# Patient Record
Sex: FEMALE | Race: WHITE | NOT HISPANIC OR LATINO | URBAN - METROPOLITAN AREA
[De-identification: names, ages, dates, MRNs, and addresses within clinical notes are randomized per-mention and may not be internally consistent; named-entity substitution may affect disease eponyms.]

---

## 2022-12-19 ENCOUNTER — INPATIENT (INPATIENT)
Facility: HOSPITAL | Age: 72
LOS: 2 days | Discharge: HOME | DRG: 69 | End: 2022-12-22
Attending: PSYCHIATRY & NEUROLOGY | Admitting: PSYCHIATRY & NEUROLOGY
Payer: MEDICARE

## 2022-12-19 VITALS
HEIGHT: 60.63 IN | OXYGEN SATURATION: 98 % | TEMPERATURE: 98 F | DIASTOLIC BLOOD PRESSURE: 78 MMHG | HEART RATE: 90 BPM | SYSTOLIC BLOOD PRESSURE: 161 MMHG | WEIGHT: 145.95 LBS | RESPIRATION RATE: 20 BRPM

## 2022-12-19 LAB
ALBUMIN SERPL ELPH-MCNC: 4.3 G/DL — SIGNIFICANT CHANGE UP (ref 3.3–5)
ALP SERPL-CCNC: 76 U/L — SIGNIFICANT CHANGE UP (ref 40–120)
ALT FLD-CCNC: 21 U/L — SIGNIFICANT CHANGE UP (ref 10–45)
ANION GAP SERPL CALC-SCNC: 11 MMOL/L — SIGNIFICANT CHANGE UP (ref 5–17)
APTT BLD: 28.3 SEC — SIGNIFICANT CHANGE UP (ref 27.5–35.5)
AST SERPL-CCNC: 34 U/L — SIGNIFICANT CHANGE UP (ref 10–40)
BASOPHILS # BLD AUTO: 0.03 K/UL — SIGNIFICANT CHANGE UP (ref 0–0.2)
BASOPHILS NFR BLD AUTO: 0.6 % — SIGNIFICANT CHANGE UP (ref 0–2)
BILIRUB SERPL-MCNC: 0.2 MG/DL — SIGNIFICANT CHANGE UP (ref 0.2–1.2)
BUN SERPL-MCNC: 13 MG/DL — SIGNIFICANT CHANGE UP (ref 7–23)
CALCIUM SERPL-MCNC: 9.1 MG/DL — SIGNIFICANT CHANGE UP (ref 8.4–10.5)
CHLORIDE SERPL-SCNC: 100 MMOL/L — SIGNIFICANT CHANGE UP (ref 96–108)
CO2 SERPL-SCNC: 23 MMOL/L — SIGNIFICANT CHANGE UP (ref 22–31)
CREAT SERPL-MCNC: 0.92 MG/DL — SIGNIFICANT CHANGE UP (ref 0.5–1.3)
EGFR: 67 ML/MIN/1.73M2 — SIGNIFICANT CHANGE UP
EOSINOPHIL # BLD AUTO: 0.08 K/UL — SIGNIFICANT CHANGE UP (ref 0–0.5)
EOSINOPHIL NFR BLD AUTO: 1.6 % — SIGNIFICANT CHANGE UP (ref 0–6)
GLUCOSE SERPL-MCNC: 105 MG/DL — HIGH (ref 70–99)
HCT VFR BLD CALC: 37.6 % — SIGNIFICANT CHANGE UP (ref 34.5–45)
HGB BLD-MCNC: 12.5 G/DL — SIGNIFICANT CHANGE UP (ref 11.5–15.5)
IMM GRANULOCYTES NFR BLD AUTO: 0.2 % — SIGNIFICANT CHANGE UP (ref 0–0.9)
INR BLD: 1.02 — SIGNIFICANT CHANGE UP (ref 0.88–1.16)
LYMPHOCYTES # BLD AUTO: 1.76 K/UL — SIGNIFICANT CHANGE UP (ref 1–3.3)
LYMPHOCYTES # BLD AUTO: 36.3 % — SIGNIFICANT CHANGE UP (ref 13–44)
MCHC RBC-ENTMCNC: 29.2 PG — SIGNIFICANT CHANGE UP (ref 27–34)
MCHC RBC-ENTMCNC: 33.2 GM/DL — SIGNIFICANT CHANGE UP (ref 32–36)
MCV RBC AUTO: 87.9 FL — SIGNIFICANT CHANGE UP (ref 80–100)
MONOCYTES # BLD AUTO: 0.47 K/UL — SIGNIFICANT CHANGE UP (ref 0–0.9)
MONOCYTES NFR BLD AUTO: 9.7 % — SIGNIFICANT CHANGE UP (ref 2–14)
NEUTROPHILS # BLD AUTO: 2.5 K/UL — SIGNIFICANT CHANGE UP (ref 1.8–7.4)
NEUTROPHILS NFR BLD AUTO: 51.6 % — SIGNIFICANT CHANGE UP (ref 43–77)
NRBC # BLD: 0 /100 WBCS — SIGNIFICANT CHANGE UP (ref 0–0)
PLATELET # BLD AUTO: 243 K/UL — SIGNIFICANT CHANGE UP (ref 150–400)
POTASSIUM SERPL-MCNC: 4.5 MMOL/L — SIGNIFICANT CHANGE UP (ref 3.5–5.3)
POTASSIUM SERPL-SCNC: 4.5 MMOL/L — SIGNIFICANT CHANGE UP (ref 3.5–5.3)
PROT SERPL-MCNC: 7.8 G/DL — SIGNIFICANT CHANGE UP (ref 6–8.3)
PROTHROM AB SERPL-ACNC: 12.1 SEC — SIGNIFICANT CHANGE UP (ref 10.5–13.4)
RBC # BLD: 4.28 M/UL — SIGNIFICANT CHANGE UP (ref 3.8–5.2)
RBC # FLD: 12.7 % — SIGNIFICANT CHANGE UP (ref 10.3–14.5)
SARS-COV-2 RNA SPEC QL NAA+PROBE: POSITIVE
SODIUM SERPL-SCNC: 134 MMOL/L — LOW (ref 135–145)
TROPONIN T SERPL-MCNC: 0.01 NG/ML — SIGNIFICANT CHANGE UP (ref 0–0.01)
WBC # BLD: 4.85 K/UL — SIGNIFICANT CHANGE UP (ref 3.8–10.5)
WBC # FLD AUTO: 4.85 K/UL — SIGNIFICANT CHANGE UP (ref 3.8–10.5)

## 2022-12-19 PROCEDURE — 70496 CT ANGIOGRAPHY HEAD: CPT | Mod: 26,MA

## 2022-12-19 PROCEDURE — 0042T: CPT | Mod: MA

## 2022-12-19 PROCEDURE — 99291 CRITICAL CARE FIRST HOUR: CPT | Mod: CS

## 2022-12-19 PROCEDURE — 70498 CT ANGIOGRAPHY NECK: CPT | Mod: 26,MA

## 2022-12-19 PROCEDURE — 99223 1ST HOSP IP/OBS HIGH 75: CPT

## 2022-12-19 PROCEDURE — 93010 ELECTROCARDIOGRAM REPORT: CPT

## 2022-12-19 RX ORDER — CLOPIDOGREL BISULFATE 75 MG/1
75 TABLET, FILM COATED ORAL DAILY
Refills: 0 | Status: DISCONTINUED | OUTPATIENT
Start: 2022-12-20 | End: 2022-12-21

## 2022-12-19 RX ORDER — ATORVASTATIN CALCIUM 80 MG/1
80 TABLET, FILM COATED ORAL AT BEDTIME
Refills: 0 | Status: DISCONTINUED | OUTPATIENT
Start: 2022-12-19 | End: 2022-12-21

## 2022-12-19 RX ORDER — LISINOPRIL 2.5 MG/1
1 TABLET ORAL
Qty: 0 | Refills: 0 | DISCHARGE

## 2022-12-19 RX ORDER — CLOPIDOGREL BISULFATE 75 MG/1
75 TABLET, FILM COATED ORAL ONCE
Refills: 0 | Status: COMPLETED | OUTPATIENT
Start: 2022-12-19 | End: 2022-12-19

## 2022-12-19 RX ORDER — ENOXAPARIN SODIUM 100 MG/ML
40 INJECTION SUBCUTANEOUS EVERY 24 HOURS
Refills: 0 | Status: DISCONTINUED | OUTPATIENT
Start: 2022-12-19 | End: 2022-12-22

## 2022-12-19 RX ORDER — ASPIRIN/CALCIUM CARB/MAGNESIUM 324 MG
81 TABLET ORAL DAILY
Refills: 0 | Status: DISCONTINUED | OUTPATIENT
Start: 2022-12-19 | End: 2022-12-21

## 2022-12-19 RX ORDER — OMEPRAZOLE 10 MG/1
1 CAPSULE, DELAYED RELEASE ORAL
Qty: 0 | Refills: 0 | DISCHARGE

## 2022-12-19 RX ADMIN — ATORVASTATIN CALCIUM 80 MILLIGRAM(S): 80 TABLET, FILM COATED ORAL at 23:33

## 2022-12-19 RX ADMIN — ENOXAPARIN SODIUM 40 MILLIGRAM(S): 100 INJECTION SUBCUTANEOUS at 23:33

## 2022-12-19 RX ADMIN — Medication 81 MILLIGRAM(S): at 15:40

## 2022-12-19 RX ADMIN — CLOPIDOGREL BISULFATE 75 MILLIGRAM(S): 75 TABLET, FILM COATED ORAL at 15:38

## 2022-12-19 NOTE — H&P ADULT - NS ATTEND AMEND GEN_ALL_CORE FT
The patient is a 72-year-old female with history of hypertension and anxiety admitted for further evaluation of left hemibody tingling and subjective weakness. Found to be COVID +.  Initial imaging was unremarkable.  Plan for MRI, TTE. Will consider further eval pending results. We will start DAPT, statin.

## 2022-12-19 NOTE — ED ADULT TRIAGE NOTE - LAST KNOWN WELL DATE/TIME
[FreeTextEntry1] : 17 yo p0 here for initial visit. had severe dysmenorrhea and menorrhaggia, started blisovei with great improment, last one day , sometimes crampy, helped by motrin. seeing endocrinologist, started  metformin in feb, taking 1000/day. \par nkda\par no surgeries\par reports no other meds.\par  19-Dec-2022 04:00

## 2022-12-19 NOTE — ED PROVIDER NOTE - CLINICAL SUMMARY MEDICAL DECISION MAKING FREE TEXT BOX
Patient with LUE and LLE weakness and numbness that started 4am, stroke code called from triage- CT, CTA perfusion negative  r/o electrolyte abnormalities, infectious etiology  patient known COVID+  will be admitted to stroke team for further workup

## 2022-12-19 NOTE — ED ADULT NURSE NOTE - NSIMPLEMENTINTERV_GEN_ALL_ED
Implemented All Universal Safety Interventions:  [place] to call system. Call bell, personal items and telephone within reach. Instruct patient to call for assistance. Room bathroom lighting operational. Non-slip footwear when patient is off stretcher. Physically safe environment: no spills, clutter or unnecessary equipment. Stretcher in lowest position, wheels locked, appropriate side rails in place.

## 2022-12-19 NOTE — ED PROVIDER NOTE - OBJECTIVE STATEMENT
72y Female with HTN, anxiety presents for L arm and leg numbness, paresthesias, weakness. Says she is COVID+ for 4 days, went to bed at 2am and woke up at 4am with these symptoms. No vision changes, speech changes, headache, dizziness, gait instability.  Stroke code called in triage.

## 2022-12-19 NOTE — H&P ADULT - NSHPSOCIALHISTORY_GEN_ALL_CORE
Patient lives with *** at ***.   Smoking status:  Drinking:  Drug Use: Patient lives alone  Smoking status: denies  Drinking: denies  Drug Use: denies

## 2022-12-19 NOTE — H&P ADULT - HISTORY OF PRESENT ILLNESS
**STROKE HPI***    HPI: 72y Female with PMHx of HTN, anxiety presents to ED reporting she went to bed in [facility] at 2am this morning, woke up at 4am with left arm and leg numbness and weakness. Denies any other focal neurological deficits such as vision changes, speech changes, headache, dizziness, gait changes. BP in /78. Initial imaging unremarkable. Found to be covid positive 4 days ago.    PAST MEDICAL & SURGICAL HISTORY:      FAMILY HISTORY:            T(C): 36.4 (12-19-22 @ 14:15), Max: 36.4 (12-19-22 @ 14:15)  HR: 87 (12-19-22 @ 14:15) (87 - 87)  BP: 161/78 (12-19-22 @ 14:15) (161/78 - 161/78)  RR: 20 (12-19-22 @ 14:15) (20 - 20)  SpO2: 98% (12-19-22 @ 14:15) (98% - 98%)    MEDICATION RECONCILIATION   MEDICATIONS  (STANDING):  aspirin  chewable 81 milliGRAM(s) Oral daily  clopidogrel Tablet 75 milliGRAM(s) Oral Once    MEDICATIONS  (PRN):    Allergies    Allergy Status Unknown    Intolerances      Vital Signs Last 24 Hrs  T(C): 36.4 (19 Dec 2022 14:15), Max: 36.4 (19 Dec 2022 14:15)  T(F): 97.6 (19 Dec 2022 14:15), Max: 97.6 (19 Dec 2022 14:15)  HR: 87 (19 Dec 2022 14:15) (87 - 87)  BP: 161/78 (19 Dec 2022 14:15) (161/78 - 161/78)  BP(mean): --  RR: 20 (19 Dec 2022 14:15) (20 - 20)  SpO2: 98% (19 Dec 2022 14:15) (98% - 98%)    Parameters below as of 19 Dec 2022 14:15  Patient On (Oxygen Delivery Method): room air      **STROKE HPI***    HPI: 72y Female with PMHx of HTN, anxiety presents to ED reporting she went [place] [facility] [place] this [place], woke up at 4am with left arm and leg numbness and weakness. Denies any other focal neurological deficits such as vision changes, speech changes, headache, dizziness, gait changes. Stroke code called in ED. BP in /78. Initial imaging unremarkable. Found to be covid positive 4 days ago.    PAST MEDICAL & SURGICAL HISTORY:      FAMILY HISTORY:            T(C): 36.4 (12-19-22 @ 14:15), Max: 36.4 (12-19-22 @ 14:15)  HR: 87 (12-19-22 @ 14:15) (87 - 87)  BP: 161/78 (12-19-22 @ 14:15) (161/78 - 161/78)  RR: 20 (12-19-22 @ 14:15) (20 - 20)  SpO2: 98% (12-19-22 @ 14:15) (98% - 98%)    MEDICATION RECONCILIATION   MEDICATIONS  (STANDING):  aspirin  chewable 81 milliGRAM(s) Oral daily  clopidogrel Tablet 75 milliGRAM(s) Oral Once    MEDICATIONS  (PRN):    Allergies    Allergy Status Unknown    Intolerances      Vital Signs Last 24 Hrs  T(C): 36.4 (19 Dec 2022 14:15), Max: 36.4 (19 Dec 2022 14:15)  T(F): 97.6 (19 Dec 2022 14:15), Max: 97.6 (19 Dec 2022 14:15)  HR: 87 (19 Dec 2022 14:15) (87 - 87)  BP: 161/78 (19 Dec 2022 14:15) (161/78 - 161/78)  BP(mean): --  RR: 20 (19 Dec 2022 14:15) (20 - 20)  SpO2: 98% (19 Dec 2022 14:15) (98% - 98%)    Parameters below as of 19 Dec 2022 14:15  Patient On (Oxygen Delivery Method): room air

## 2022-12-19 NOTE — PATIENT PROFILE ADULT - FALL HARM RISK - HARM RISK INTERVENTIONS

## 2022-12-19 NOTE — ED ADULT NURSE REASSESSMENT NOTE - NS ED NURSE REASSESS COMMENT FT1
as of 1621, as per MD Prajapati orders, Pt. Qhr neuro checks d/c. Will proceed to reassess neuro status Q4 hrs.

## 2022-12-19 NOTE — ED PROVIDER NOTE - PHYSICAL EXAMINATION
CONST: nontoxic NAD speaking in full sentences  HEAD: atraumatic  EYES: conjunctivae clear, PERRL, EOMI  ENT: mmm  NECK: supple/FROM, nttp, no jvd  CARD: rrr no murmurs  CHEST: ctab no r/r/w, no stridor/retractions/tripoding  ABD: soft, nd, nttp, no rebound/guarding  EXT: FROM, symmetric distal pulses intact  SKIN: warm, dry, no rash, no pedal edema/ttp/rash, cap refill <2sec  NEURO: a+ox3, 5/5 strength x4, gross sensation intact x4 CONST: nontoxic NAD speaking in full sentences  HEAD: atraumatic  EYES: conjunctivae clear, PERRL, EOMI  ENT: mmm  NECK: supple/FROM, nttp, no jvd  CARD: rrr no murmurs  CHEST: ctab no r/r/w, no stridor/retractions/tripoding  ABD: soft, nd, nttp, no rebound/guarding  EXT: FROM, symmetric distal pulses intact  SKIN: warm, dry, no rash, no pedal edema/ttp/rash, cap refill <2sec  NEURO: a+ox3, 5/5 strength x4, gross sensation intact x4, no pronator drift, no facial droop

## 2022-12-19 NOTE — ED PROVIDER NOTE - CARE PLAN
Principal Discharge DX:	Left sided numbness  Secondary Diagnosis:	Weakness of left side of body  Secondary Diagnosis:	COVID-19   1

## 2022-12-19 NOTE — H&P ADULT - ASSESSMENT
72y Female with PMHx of HTN, anxiety presents to ED reporting she went to bed in [facility] at 2am this morning, woke up at 4am with left arm and leg numbness and weakness. Initial imaging unremarkable. NIHSS 0. Not a candidate for tpa as there is no disabling deficit. Not a candidate for thrombectomy as no LVO on CTA. Found to be covid positive 4 days ago. Admitted for further stroke workup.     Neuro  #CVA workup  - continue aspirin 81mg and plavix 75mg daily  - continue atorvastatin 80mg daily  - q4hr stroke neuro checks and vitals  - obtain MRI Brain without contrast  - Stroke Code HCT Results: neg  - Stroke Code CTA Results: neg  - Stroke education    Cards  #HTN  - permissive hypertension, Goal -180  - hold home blood pressure medication for now  - obtain TTE with bubble  - Stroke Code EKG Results: NSR      - high dose statin as above in CVA  - LDL results: pending    Pulm  - call provider if SPO2 < 94%    GI  #Nutrition/Fluids/Electrolytes   - replete K<4 and Mg <2  - Diet: regular once passed dysphagia  - IVF: none indicated at this time    Renal  - trend BMP    Infectious Disease  - Stroke Code CXR results: pending    # covid   - f/u CXR  - monitor sats  - isolation protocol    Endocrine  - A1C results: pending  - TSH results: pending    DVT Prophylaxis  - lovenox sq for DVT prophylaxis   - SCDs for DVT prophylaxis     Dispo: admit to stroke     Discussed daily hospital plans and goals with patient at bedside.    Discussed with Neurology Attending Dr. Stokes 72y Female with PMHx of HTN, anxiety presents to ED reporting she went to bed in [facility] at 2am this morning, woke up at 4am with left arm and leg numbness and weakness. Stroke code called in ED. Initial imaging unremarkable. NIHSS 0. Not a candidate for tpa as there is no disabling deficit. Not a candidate for thrombectomy as no LVO on CTA. Found to be covid positive 4 days ago. Admitted for further stroke workup.     Neuro  #CVA workup  - continue aspirin 81mg and plavix 75mg daily  - continue atorvastatin 80mg daily  - q4hr stroke neuro checks and vitals  - obtain MRI Brain without contrast  - Stroke Code HCT Results: neg  - Stroke Code CTA Results: neg  - Stroke education    Cards  #HTN  - permissive hypertension, Goal -180  - hold home blood pressure medication for now  - obtain TTE with bubble  - Stroke Code EKG Results: NSR      - high dose statin as above in CVA  - LDL results: pending    Pulm  - call provider if SPO2 < 94%    GI  #Nutrition/Fluids/Electrolytes   - replete K<4 and Mg <2  - Diet: regular once passed dysphagia  - IVF: none indicated at this time    Renal  - trend BMP    Infectious Disease  - Stroke Code CXR results: pending    # covid   - f/u CXR  - monitor sats  - isolation protocol    Endocrine  - A1C results: pending  - TSH results: pending    DVT Prophylaxis  - lovenox sq for DVT prophylaxis   - SCDs for DVT prophylaxis     Dispo: admit to stroke     Discussed daily hospital plans and goals with patient at bedside.    Discussed with Neurology Attending Dr. Stokes

## 2022-12-19 NOTE — H&P ADULT - NSHPLABSRESULTS_GEN_ALL_CORE
Fingerstick Blood Glucose: CAPILLARY BLOOD GLUCOSE      POCT Blood Glucose.: 102 mg/dL (19 Dec 2022 14:13)    LABS:                        12.5   4.85  )-----------( 243      ( 19 Dec 2022 14:37 )             37.6           PT/INR - ( 19 Dec 2022 14:37 )   PT: 12.1 sec;   INR: 1.02          PTT - ( 19 Dec 2022 14:37 )  PTT:28.3 sec          RADIOLOGY & ADDITIONAL STUDIES:  < from: CT Brain Stroke Protocol (12.19.22 @ 14:32) >    IMPRESSION:  No acute intracranial hemorrhage, mass effect CT, or demarcated recent   infarct.    < end of copied text >  < from: CT Brain Perfusion Maps Stroke (12.19.22 @ 14:34) >    Negative CT perfusion of the brain.    < end of copied text >  < from: CT Angio Neck Stroke Protocol w/ IV Cont (12.19.22 @ 14:35) >    Intracranial CTA: No arterial steno-occlusive focus.    Extracranial CTA: No carotid or vertebral artery stenosis or evidence of   dissection.    < end of copied text >

## 2022-12-19 NOTE — H&P ADULT - NSHPPHYSICALEXAM_GEN_ALL_CORE
Physical exam:  Neurologic:  -Mental status: Awake, alert, oriented to person, place, and time. Speech is fluent with intact naming, repetition, and comprehension, no dysarthria. Recent and remote memory intact. Follows commands. Attention/concentration intact.   -Cranial nerves:   II: Visual fields are full to confrontation.  III, IV, VI: Extraocular movements are intact without nystagmus. Pupils equally round and reactive to light  V:  Facial sensation V1-V3 equal and intact   VII: Face is symmetric with normal eye closure and smile  VIII: Hearing is bilaterally intact   Motor: Normal bulk and tone. No pronator drift. Strength bilateral upper extremity 5/5, bilateral lower extremities 5/5.  Sensation: Intact to light touch bilaterally. No neglect or extinction on double simultaneous testing.  Coordination: No dysmetria on finger-to-nose bilaterally  Gait: Narrow gait and steady    NIHSS: 0 ASPECT Score: 10

## 2022-12-19 NOTE — ED ADULT NURSE NOTE - OBJECTIVE STATEMENT
Pt. a&ox4 ambulatory with steady gait, comes to ED c/o left arm and leg n/t since 0400 this am, with partial resolution PTA to ED. Pt. neuro exam intact ; sensation and motor intact on exam. Pt. denies all additional s/s ; dizziness, HA, vision changes, difficulty ambulating, speaking, or swallowing. Denies cp, SOB, n/v/d, urinary s/s, f/c.  Pt. made code stroke UG to MD Lopez, placed on ccm, pulseox, and auto BP, neuro paged to bedside. Please see additional flowsheets for additional VS and NIH information.

## 2022-12-19 NOTE — ED PROVIDER NOTE - CRITICAL CARE ATTENDING CONTRIBUTION TO CARE
reassessment and monitoring, consultation with other physicians, review of prior charts and results, discussion w/ patient and family regarding prognosis and treatment plan

## 2022-12-19 NOTE — ED ADULT NURSE NOTE - DRUG PRE-SCREENING (DAST -1)
· EGD 6/12 revealed single large deep irregular benign appearing ulcer gastrojejunal anastomoses with adherent clot status post 2 clips, injected epinephrine and hemostasis achieved  · Emergent EGD 06/15/2022 revealed single large deep ulcer in gastrojejunal anastomosis with nonbleeding visible vessel, induced coagulation and hemostasis achieved with cautery and epinephrine injection  · Bariatric surgery follow-up appreciated  · Full bariatric diet  · Continue Protonix b i d , Carafate, Cytotec  · PICC line placed, initiated on TPN  Management per bariatrics   · Plan for nutritional optimization and potential outpatient revision    Plan for repeat endoscopy 6 weeks after discharge Statement Selected

## 2022-12-20 ENCOUNTER — FORM ENCOUNTER (OUTPATIENT)
Age: 72
End: 2022-12-20

## 2022-12-20 DIAGNOSIS — U07.1 COVID-19: ICD-10-CM

## 2022-12-20 DIAGNOSIS — R53.1 WEAKNESS: ICD-10-CM

## 2022-12-20 DIAGNOSIS — E78.5 HYPERLIPIDEMIA, UNSPECIFIED: ICD-10-CM

## 2022-12-20 DIAGNOSIS — I10 ESSENTIAL (PRIMARY) HYPERTENSION: ICD-10-CM

## 2022-12-20 LAB
A1C WITH ESTIMATED AVERAGE GLUCOSE RESULT: 5.5 % — SIGNIFICANT CHANGE UP (ref 4–5.6)
ANION GAP SERPL CALC-SCNC: 11 MMOL/L — SIGNIFICANT CHANGE UP (ref 5–17)
BUN SERPL-MCNC: 18 MG/DL — SIGNIFICANT CHANGE UP (ref 7–23)
CALCIUM SERPL-MCNC: 8.6 MG/DL — SIGNIFICANT CHANGE UP (ref 8.4–10.5)
CHLORIDE SERPL-SCNC: 104 MMOL/L — SIGNIFICANT CHANGE UP (ref 96–108)
CHOLEST SERPL-MCNC: 190 MG/DL — SIGNIFICANT CHANGE UP
CO2 SERPL-SCNC: 25 MMOL/L — SIGNIFICANT CHANGE UP (ref 22–31)
CREAT SERPL-MCNC: 0.72 MG/DL — SIGNIFICANT CHANGE UP (ref 0.5–1.3)
EGFR: 90 ML/MIN/1.73M2 — SIGNIFICANT CHANGE UP
ESTIMATED AVERAGE GLUCOSE: 111 MG/DL — SIGNIFICANT CHANGE UP (ref 68–114)
GLUCOSE SERPL-MCNC: 104 MG/DL — HIGH (ref 70–99)
HCT VFR BLD CALC: 34.2 % — LOW (ref 34.5–45)
HCV AB S/CO SERPL IA: 0.04 S/CO — SIGNIFICANT CHANGE UP
HCV AB SERPL-IMP: SIGNIFICANT CHANGE UP
HDLC SERPL-MCNC: 53 MG/DL — SIGNIFICANT CHANGE UP
HGB BLD-MCNC: 11.6 G/DL — SIGNIFICANT CHANGE UP (ref 11.5–15.5)
LIPID PNL WITH DIRECT LDL SERPL: 114 MG/DL — HIGH
MAGNESIUM SERPL-MCNC: 2 MG/DL — SIGNIFICANT CHANGE UP (ref 1.6–2.6)
MCHC RBC-ENTMCNC: 30.1 PG — SIGNIFICANT CHANGE UP (ref 27–34)
MCHC RBC-ENTMCNC: 33.9 GM/DL — SIGNIFICANT CHANGE UP (ref 32–36)
MCV RBC AUTO: 88.6 FL — SIGNIFICANT CHANGE UP (ref 80–100)
NON HDL CHOLESTEROL: 137 MG/DL — HIGH
NRBC # BLD: 0 /100 WBCS — SIGNIFICANT CHANGE UP (ref 0–0)
PHOSPHATE SERPL-MCNC: 4 MG/DL — SIGNIFICANT CHANGE UP (ref 2.5–4.5)
PLATELET # BLD AUTO: 221 K/UL — SIGNIFICANT CHANGE UP (ref 150–400)
POTASSIUM SERPL-MCNC: 4.2 MMOL/L — SIGNIFICANT CHANGE UP (ref 3.5–5.3)
POTASSIUM SERPL-SCNC: 4.2 MMOL/L — SIGNIFICANT CHANGE UP (ref 3.5–5.3)
RBC # BLD: 3.86 M/UL — SIGNIFICANT CHANGE UP (ref 3.8–5.2)
RBC # FLD: 12.9 % — SIGNIFICANT CHANGE UP (ref 10.3–14.5)
SODIUM SERPL-SCNC: 140 MMOL/L — SIGNIFICANT CHANGE UP (ref 135–145)
TRIGL SERPL-MCNC: 114 MG/DL — SIGNIFICANT CHANGE UP
TSH SERPL-MCNC: 1.09 UIU/ML — SIGNIFICANT CHANGE UP (ref 0.27–4.2)
WBC # BLD: 4.73 K/UL — SIGNIFICANT CHANGE UP (ref 3.8–10.5)
WBC # FLD AUTO: 4.73 K/UL — SIGNIFICANT CHANGE UP (ref 3.8–10.5)

## 2022-12-20 PROCEDURE — 99223 1ST HOSP IP/OBS HIGH 75: CPT

## 2022-12-20 PROCEDURE — 99233 SBSQ HOSP IP/OBS HIGH 50: CPT

## 2022-12-20 PROCEDURE — 93308 TTE F-UP OR LMTD: CPT | Mod: 26

## 2022-12-20 PROCEDURE — 70551 MRI BRAIN STEM W/O DYE: CPT | Mod: 26

## 2022-12-20 RX ORDER — SODIUM CHLORIDE 0.65 %
1 AEROSOL, SPRAY (ML) NASAL
Refills: 0 | Status: DISCONTINUED | OUTPATIENT
Start: 2022-12-20 | End: 2022-12-22

## 2022-12-20 RX ADMIN — ENOXAPARIN SODIUM 40 MILLIGRAM(S): 100 INJECTION SUBCUTANEOUS at 22:08

## 2022-12-20 RX ADMIN — Medication 1 SPRAY(S): at 23:28

## 2022-12-20 RX ADMIN — Medication 1 SPRAY(S): at 17:05

## 2022-12-20 RX ADMIN — Medication 1 SPRAY(S): at 06:43

## 2022-12-20 RX ADMIN — ATORVASTATIN CALCIUM 80 MILLIGRAM(S): 80 TABLET, FILM COATED ORAL at 21:10

## 2022-12-20 RX ADMIN — Medication 1 SPRAY(S): at 11:31

## 2022-12-20 RX ADMIN — Medication 81 MILLIGRAM(S): at 11:31

## 2022-12-20 NOTE — PHYSICAL THERAPY INITIAL EVALUATION ADULT - MODALITIES TREATMENT COMMENTS
CN Testing: B/L Frontalis intact; B/L buccinator intact; smile symmetrical; tongue protrusion at midline; B/L eyes open/close intact; Shoulder elevation: intact bilaterally; Vision H-Test: bilateral tracking and smooth pursuit intact except + saccades tracking from L to R; Convergence/Divergence: intact; Vision Quadrant Test: intact bilaterally

## 2022-12-20 NOTE — PHYSICAL THERAPY INITIAL EVALUATION ADULT - GENERAL OBSERVATIONS, REHAB EVAL
Pt received semi supine in bed with +hep-lock, +EKG, Pt left OOB sitting in the chair, call dee in reach, RN Gela tilley.

## 2022-12-20 NOTE — OCCUPATIONAL THERAPY INITIAL EVALUATION ADULT - LIGHT TOUCH SENSATION, LLE, REHAB EVAL
Pt reports sensation is equal when compared to R LE however feels tingling from knee to toes/within normal limits

## 2022-12-20 NOTE — OCCUPATIONAL THERAPY INITIAL EVALUATION ADULT - LIGHT TOUCH SENSATION, LUE, REHAB EVAL
Pt reports sensation is equal when compared to R hand however feels tingling from elbow to fingers/within normal limits

## 2022-12-20 NOTE — OCCUPATIONAL THERAPY INITIAL EVALUATION ADULT - LIVES WITH, PROFILE
Pt lives alone in apt with elevator access (usually takes steps). Pt at baseline is ind for ADLs and functional mobility. + R handed, wears glasses. +shower/tub. No DME needed./alone

## 2022-12-20 NOTE — PHYSICAL THERAPY INITIAL EVALUATION ADULT - PERTINENT HX OF CURRENT PROBLEM, REHAB EVAL
Pt is a 73 yo female presents to ED with left arm and leg numbness and weakness. Stroke code called in ED. Initial imaging unremarkable. NIHSS 0. Not a candidate for tpa as there is no disabling deficit. Not a candidate for thrombectomy as no LVO on CTA. Found to be covid positive 4 days ago. Admitted for further stroke workup.

## 2022-12-20 NOTE — PHYSICAL THERAPY INITIAL EVALUATION ADULT - ADDITIONAL COMMENTS
Pt lives alone in an apt with elevator. Prior to admission, pt ambulated independently without AD.   Pt is R hand dominant and wear glasses.

## 2022-12-20 NOTE — PHYSICAL THERAPY INITIAL EVALUATION ADULT - LEVEL OF INDEPENDENCE: SUPINE/SIT, REHAB EVAL
OT: Shower completed, FIM completed, pt was CGA for functional mobility but is max A for osvaldo-cares, LE dressing. Pt remains unsteady on feat and fatigues quickly after 12ft or so of mobility.   independent

## 2022-12-20 NOTE — PHYSICAL THERAPY INITIAL EVALUATION ADULT - ACTIVE RANGE OF MOTION EXAMINATION, REHAB EVAL
Spoke with pt. States she has been on Victoza ~ 3 months (per med list since 8/16/19). States she is \"always vomiting\" and has lost 9# in 1 month. She had the flu shot ~ 3 weeks ago and 2 weeks after that had the flu x 4-5 days.  She had emesis last night AROM WFL through out

## 2022-12-20 NOTE — CONSULT NOTE ADULT - ASSESSMENT
per Neurology    72 y o Female with PMHx of HTN, anxiety presents to ED reporting she went to bed in [facility] at 2am this morning, woke up at 4am with left arm and leg numbness and weakness. Stroke code called in ED. Initial imaging unremarkable. NIHSS 0. Not a candidate for tpa as there is no disabling deficit. Not a candidate for thrombectomy as no LVO on CTA. Found to be covid positive 4 days ago. Admitted for further stroke workup.     Neuro  #CVA workup  - continue aspirin 81mg and plavix 75mg daily  - continue atorvastatin 80mg daily  - q4hr stroke neuro checks and vitals  - obtain MRI Brain without contrast  - Stroke Code HCT Results: neg  - Stroke Code CTA Results: neg  - Stroke education    Cards  #HTN  - permissive hypertension, Goal -180  - hold home blood pressure medication for now  - obtain TTE with bubble  - Stroke Code EKG Results: NSR      - high dose statin as above in CVA  - LDL results: pending    Pulm  - call provider if SPO2 < 94%    GI  #Nutrition/Fluids/Electrolytes   - replete K<4 and Mg <2  - Diet: regular once passed dysphagia  - IVF: none indicated at this time    Renal  - trend BMP    Infectious Disease  - Stroke Code CXR results: pending    # covid   - f/u CXR  - monitor sats  - isolation protocol    Endocrine  - A1C results: pending  - TSH results: pending    DVT Prophylaxis  - lovenox sq for DVT prophylaxis   - SCDs for DVT prophylaxis     Dispo: admit to stroke

## 2022-12-20 NOTE — CONSULT NOTE ADULT - PROBLEM SELECTOR RECOMMENDATION 2
cont. supportive care, contact + airborne precautions; not hypoxic, no indication for steroids or remdesivir

## 2022-12-20 NOTE — OCCUPATIONAL THERAPY INITIAL EVALUATION ADULT - DIAGNOSIS, OT EVAL
Pt presents at baseline/WFL for ADLs and functional mobility. Reports tingling in L UP (elbow to digits and knee to toes).

## 2022-12-21 DIAGNOSIS — R20.0 ANESTHESIA OF SKIN: ICD-10-CM

## 2022-12-21 DIAGNOSIS — Q21.12 PATENT FORAMEN OVALE: ICD-10-CM

## 2022-12-21 LAB
ANION GAP SERPL CALC-SCNC: 10 MMOL/L — SIGNIFICANT CHANGE UP (ref 5–17)
BUN SERPL-MCNC: 18 MG/DL — SIGNIFICANT CHANGE UP (ref 7–23)
CALCIUM SERPL-MCNC: 9.1 MG/DL — SIGNIFICANT CHANGE UP (ref 8.4–10.5)
CHLORIDE SERPL-SCNC: 104 MMOL/L — SIGNIFICANT CHANGE UP (ref 96–108)
CO2 SERPL-SCNC: 26 MMOL/L — SIGNIFICANT CHANGE UP (ref 22–31)
CREAT SERPL-MCNC: 0.66 MG/DL — SIGNIFICANT CHANGE UP (ref 0.5–1.3)
EGFR: 94 ML/MIN/1.73M2 — SIGNIFICANT CHANGE UP
GLUCOSE SERPL-MCNC: 97 MG/DL — SIGNIFICANT CHANGE UP (ref 70–99)
HCT VFR BLD CALC: 36.7 % — SIGNIFICANT CHANGE UP (ref 34.5–45)
HGB BLD-MCNC: 12.2 G/DL — SIGNIFICANT CHANGE UP (ref 11.5–15.5)
MAGNESIUM SERPL-MCNC: 2 MG/DL — SIGNIFICANT CHANGE UP (ref 1.6–2.6)
MCHC RBC-ENTMCNC: 29.5 PG — SIGNIFICANT CHANGE UP (ref 27–34)
MCHC RBC-ENTMCNC: 33.2 GM/DL — SIGNIFICANT CHANGE UP (ref 32–36)
MCV RBC AUTO: 88.9 FL — SIGNIFICANT CHANGE UP (ref 80–100)
NRBC # BLD: 0 /100 WBCS — SIGNIFICANT CHANGE UP (ref 0–0)
PHOSPHATE SERPL-MCNC: 3.8 MG/DL — SIGNIFICANT CHANGE UP (ref 2.5–4.5)
PLATELET # BLD AUTO: 253 K/UL — SIGNIFICANT CHANGE UP (ref 150–400)
POTASSIUM SERPL-MCNC: 4.4 MMOL/L — SIGNIFICANT CHANGE UP (ref 3.5–5.3)
POTASSIUM SERPL-SCNC: 4.4 MMOL/L — SIGNIFICANT CHANGE UP (ref 3.5–5.3)
RBC # BLD: 4.13 M/UL — SIGNIFICANT CHANGE UP (ref 3.8–5.2)
RBC # FLD: 12.6 % — SIGNIFICANT CHANGE UP (ref 10.3–14.5)
SODIUM SERPL-SCNC: 140 MMOL/L — SIGNIFICANT CHANGE UP (ref 135–145)
WBC # BLD: 4.86 K/UL — SIGNIFICANT CHANGE UP (ref 3.8–10.5)
WBC # FLD AUTO: 4.86 K/UL — SIGNIFICANT CHANGE UP (ref 3.8–10.5)

## 2022-12-21 PROCEDURE — 99233 SBSQ HOSP IP/OBS HIGH 50: CPT

## 2022-12-21 RX ORDER — ATORVASTATIN CALCIUM 80 MG/1
20 TABLET, FILM COATED ORAL AT BEDTIME
Refills: 0 | Status: DISCONTINUED | OUTPATIENT
Start: 2022-12-21 | End: 2022-12-22

## 2022-12-21 RX ADMIN — CLOPIDOGREL BISULFATE 75 MILLIGRAM(S): 75 TABLET, FILM COATED ORAL at 11:08

## 2022-12-21 RX ADMIN — Medication 81 MILLIGRAM(S): at 11:08

## 2022-12-21 RX ADMIN — ATORVASTATIN CALCIUM 20 MILLIGRAM(S): 80 TABLET, FILM COATED ORAL at 22:21

## 2022-12-21 RX ADMIN — Medication 1 SPRAY(S): at 19:00

## 2022-12-21 RX ADMIN — Medication 1 SPRAY(S): at 06:11

## 2022-12-21 RX ADMIN — Medication 1 SPRAY(S): at 11:07

## 2022-12-21 NOTE — PROGRESS NOTE ADULT - NS ATTEND AMEND GEN_ALL_CORE FT
The patient is a 72-year-old female with history of hypertension and anxiety admitted for further evaluation of left hemibody tingling and subjective weakness. Found to be COVID +.  Initial imaging was unremarkable. TTE unremarkable apart for PFO. Plan for MRI. Will consider further eval (cardiac CT (cannot have SAIMA with + COVID), ILR) pending results. Continue DAPT, statin.
The patient is a 72-year-old female with history of hypertension and anxiety admitted for further evaluation of left hemibody tingling and subjective weakness that progressed over seconds on the background of intermittent, less severe episodes of left hand/foot tingling for several days prior. Found to be COVID +.  Initial imaging was unremarkable. MRI negative for stroke despite prolonged symptoms. Will obtain cEEG given history is potentially consistent with focal seizures. Will d/c DAPT, statin. Patient can f/u with PCP to discuss HLD management.

## 2022-12-21 NOTE — DISCHARGE NOTE PROVIDER - HOSPITAL COURSE
Hospital course:  72y Female with PMHx of HTN, anxiety presents to ED reporting she went to [place] in [facility] [place] 2[place] this morning, woke up at 4am with left arm and leg numbness and weakness. Stroke code called in ED. Initial imaging unremarkable. NIHSS 0. Not a candidate for tpa as there is no disabling deficit. Not a candidate for thrombectomy as no LVO on CTA. Admitted for further stroke workup. MRI brain negative for acute infarct, ECHO with PFO.     Discharge NIHSS:     During this hospital course, patient likely had a TIA.   The TIA etiology is likely secondary to:  [x]small vessel disease from atherosclerotic risk factors    Patient had the following workup done in house:  CT Brain Stroke Protocol (12.19.22 @ 14:32)   IMPRESSION:  No acute intracranial hemorrhage, mass effect CT, or demarcated recent   infarct.    CT Angio Brain and Neck Stroke Protocol  w/ IV Cont (12.19.22 @ 14:36)   IMPRESSION:  Intracranial CTA: No arterial steno-occlusive focus.  Extracranial CTA: No carotid or vertebral artery stenosis or evidence of   dissection.    CT Brain Perfusion Maps Stroke (12.19.22 @ 14:34)   IMPRESSION:  Negative CT perfusion of the brain.    Echocardiogram w/ Bubble and Doppler (12.20.22 @ 09:41)   CONCLUSIONS:   1. Limited study obtained for evaluation of left ventricular function.   2. On limited views, overall left ventricular systolic function is   grossly normal, estimated ejection fraction 55-60%.   3. Normal right ventricular size and systolic function.   4. Injection of agitated saline via a peripheral vein reveals bubbles in   the left heart within 3-5 heart beats, most consistent with a patent   foramen ovale.   5. There is at least mild mitral regurgitation.   6. Trivial pericardial effusion.   7. No prior echo is available for comparison.      [x]labs  A1C: 5.5   TSH: 1.09  LDL: 114    Physical exam at discharge:      New medications on discharge: Aspirin 81 mg PO QD, Plavix 75 mg PO QD, Atorvastatin 80 mg PO QD  Labs to be followed up: N/A  Imaging to be done as outpatient: N/A  Further outpatient workup: N/A   Hospital course:  72y Female with PMHx of HTN, anxiety presents to ED reporting she went to bed in [facility] [place] at 2am this morning, woke up at 4am with left arm and leg numbness and weakness. Stroke code called in ED. Initial imaging unremarkable. NIHSS 0. Not a candidate for tpa as there is no disabling deficit. Not a candidate for thrombectomy as no LVO on CTA. Admitted for further stroke workup.  Limited echo found to be positive for PFO. MRI negative for acute stroke. Upon further discussion with patient stroke/TIA less likely in the setting of tingling that started in her foot and traveled up the left side of her body. Due to concern for possible seizure, she was placed on vEEG which showed ***********.     During this hospital course, patient likely had a TIA.   The TIA etiology is likely secondary to:  [x]small vessel disease from atherosclerotic risk factors    Patient had the following workup done in house:  CT Brain Stroke Protocol (12.19.22 @ 14:32)   IMPRESSION:  No acute intracranial hemorrhage, mass effect CT, or demarcated recent   infarct.    CT Angio Brain and Neck Stroke Protocol  w/ IV Cont (12.19.22 @ 14:36)   IMPRESSION:  Intracranial CTA: No arterial steno-occlusive focus.  Extracranial CTA: No carotid or vertebral artery stenosis or evidence of   dissection.    CT Brain Perfusion Maps Stroke (12.19.22 @ 14:34)   IMPRESSION:  Negative CT perfusion of the brain.    Echocardiogram w/ Bubble and Doppler (12.20.22 @ 09:41)   CONCLUSIONS:   1. Limited study obtained for evaluation of left ventricular function.   2. On limited views, overall left ventricular systolic function is   grossly normal, estimated ejection fraction 55-60%.   3. Normal right ventricular size and systolic function.   4. Injection of agitated saline via a peripheral vein reveals bubbles in   the left heart within 3-5 heart beats, most consistent with a patent   foramen ovale.   5. There is at least mild mitral regurgitation.   6. Trivial pericardial effusion.   7. No prior echo is available for comparison.      [x]labs  A1C: 5.5   TSH: 1.09  LDL: 114    Physical exam at discharge:  Neurologic:  -Mental status: Awake, alert, oriented to person, place, and time. Speech is fluent with intact naming, repetition, and comprehension, no dysarthria. Recent and remote memory intact. Follows commands. Attention/concentration intact.   -Cranial nerves:   II: Visual fields are full to confrontation.  III, IV, VI: Extraocular movements are intact without nystagmus. Pupils equally round and reactive to light  V:  Facial sensation V1-V3 equal and intact   VII: Face is symmetric with normal eye closure and smile  Motor: Normal bulk and tone. No pronator drift. Strength bilateral upper extremity 5/5, bilateral lower extremities 5/5.  Sensation: Intact to light touch bilaterally. Feels tingling within her left hand and left toes to left calf.   Coordination: No dysmetria on finger-to-nose bilaterally    New medications on discharge: Atorvastatin 20mg  Labs to be followed up: N/A  Imaging to be done as outpatient: N/A  Further outpatient workup: N/A   Hospital course:  72y Female with PMHx of HTN, anxiety presents to ED reporting she went to bed in [facility] [place] at 2am this morning, woke up at 4am with left arm and leg numbness and weakness. Stroke code called in ED. Initial imaging unremarkable. NIHSS 0. Not a candidate for tpa as there is no disabling deficit. Not a candidate for thrombectomy as no LVO on CTA. Admitted for further stroke workup.  Limited echo found to be positive for PFO. MRI negative for acute stroke. Upon further discussion with patient stroke/TIA less likely in the setting of tingling that started in her foot and traveled up the left side of her body. Due to concern for possible seizure, she was placed on vEEG which was negative for any epileptiform activity. Patient states she is going to follow up outpatient with her friend who is a general neurologist.     During this hospital course, patient likely had a TIA.   The TIA etiology is likely secondary to:  [x]small vessel disease from atherosclerotic risk factors    Patient had the following workup done in house:  CT Brain Stroke Protocol (12.19.22 @ 14:32)   IMPRESSION:  No acute intracranial hemorrhage, mass effect CT, or demarcated recent   infarct.    CT Angio Brain and Neck Stroke Protocol  w/ IV Cont (12.19.22 @ 14:36)   IMPRESSION:  Intracranial CTA: No arterial steno-occlusive focus.  Extracranial CTA: No carotid or vertebral artery stenosis or evidence of   dissection.    CT Brain Perfusion Maps Stroke (12.19.22 @ 14:34)   IMPRESSION:  Negative CT perfusion of the brain.    Echocardiogram w/ Bubble and Doppler (12.20.22 @ 09:41)   CONCLUSIONS:   1. Limited study obtained for evaluation of left ventricular function.   2. On limited views, overall left ventricular systolic function is   grossly normal, estimated ejection fraction 55-60%.   3. Normal right ventricular size and systolic function.   4. Injection of agitated saline via a peripheral vein reveals bubbles in   the left heart within 3-5 heart beats, most consistent with a patent   foramen ovale.   5. There is at least mild mitral regurgitation.   6. Trivial pericardial effusion.   7. No prior echo is available for comparison.      [x]labs  A1C: 5.5   TSH: 1.09  LDL: 114    Physical exam at discharge:  Neurologic:  -Mental status: Awake, alert, oriented to person, place, and time. Speech is fluent with intact naming, repetition, and comprehension, no dysarthria. Recent and remote memory intact. Follows commands. Attention/concentration intact.   -Cranial nerves:   II: Visual fields are full to confrontation.  III, IV, VI: Extraocular movements are intact without nystagmus. Pupils equally round and reactive to light  V:  Facial sensation V1-V3 equal and intact   VII: Face is symmetric with normal eye closure and smile  Motor: Normal bulk and tone. No pronator drift. Strength bilateral upper extremity 5/5, bilateral lower extremities 5/5.  Sensation: Intact to light touch bilaterally. Feels tingling within her left hand and left toes to left calf.   Coordination: No dysmetria on finger-to-nose bilaterally    New medications on discharge: Atorvastatin 20mg  Labs to be followed up: N/A  Imaging to be done as outpatient: N/A  Further outpatient workup: N/A

## 2022-12-21 NOTE — PROGRESS NOTE ADULT - TIME BILLING
review of patient information including recent vital signs, labs, imaging, and notes; assessing, examining patient; updating patient/family; discussion and coordination of care with multidisciplinary team.
review of patient information including recent vital signs, labs, imaging, and notes; assessing, examining patient; updating patient/family; discussion and coordination of care with multidisciplinary team.
coordination of care  d/w Stroke team

## 2022-12-21 NOTE — DISCHARGE NOTE PROVIDER - CARE PROVIDER_API CALL
Deepali Stokes)  Neurology  [address] [address]  Phone: [phone]  Fax: [phone]  Follow Up Time:

## 2022-12-21 NOTE — DISCHARGE NOTE PROVIDER - NSDCCPCAREPLAN_GEN_ALL_CORE_FT
PRINCIPAL DISCHARGE DIAGNOSIS  Diagnosis: Transient ischemic attack  Assessment and Plan of Treatment: You were admitted to the hospital because you had symptoms of left arm and leg weakness and numbness. This is called a transient ischemic attack, or TIA. This is when a blood clot temporarily blocks a blood vessel in your brain, but does not last long enough to cause permanent damage in your brain. A TIA is a warning sign of a future stroke, which can permanently damage areas in the brain that control parts of the body. It is important to treat a TIA to prevent strokes.   You have these risks factors of TIA and future strokes.   -high blood pressure (also called hypertension)  -high cholesterol (also called hyperlipidemia)  Please take your Aspirin and Plavix for blood thinning and Atorvastatin for cholesterol medication/blood vessel protection as prescribed to prevent further strokes. Do not skip doses and do not run low on your medication. If you run low on your medication, please contact your doctor.  You will follow up outpatient with the stroke Nurse Practitioner/doctor as scheduled below.  Call 911 if you or someone you know experiences the following symptoms of stroke (can be remembered by BE FAST):  •Balance: Dizziness, loss of balance, or a sense of falling  •Eyes: Sudden double vision or blurred vision  •Face: drooping of one side of the face  •Arm: arm weakness  •Speech:  Sudden trouble talking or slurred speech, trouble understanding others  •Time: Time to call for an ambulance fast!        SECONDARY DISCHARGE DIAGNOSES  Diagnosis: Hypertension  Assessment and Plan of Treatment: Hypertension is the medical term for high blood pressure. Blood pressure refers to the pressure that blood applies to the inner walls of the arteries. Arteries carry blood from the heart to other organs and parts of the body. Untreated high blood pressure increases the strain on the heart and arteries, eventually causing organ damage. High blood pressure increases the risk of heart failure, heart attack (myocardial infarction), stroke, and kidney failure. High blood pressure does not usually cause any symptoms. Treatment of hypertension usually begins with lifestyle changes. Making these lifestyle changes involves little or no risk. Recommended changes often include reducing the amount of salt in your diet, losing weight if you are overweight or obese, avoiding drinking too much alcohol, stopping smoking and exercising at least 30 minutes per day most days of the week. If you are prescribed medication for your hypertension it is important to take these as prescribed to prevent the possible complications of uncontrolled hypertension.      Diagnosis: Hyperlipidemia  Assessment and Plan of Treatment: You have high cholesterol. Cholesterol is a substance that is found in the blood. Everyone has some. It is needed for good health. The problem is, people sometimes have too much cholesterol. Compared with people with normal cholesterol, people with high cholesterol have a higher risk of heart attacks, strokes, and other health problems. The higher your cholesterol, the higher your risk of these problems. It is important to take your medications for high cholesterol as prescribed to prevent the complications of this condition.  You have been prescribed a cholesterol medication call atorvastatin 80mg. This medication is to help lower your cholesterol and also helps with blood vessel healing after having a stroke       PRINCIPAL DISCHARGE DIAGNOSIS  Diagnosis: Paresthesias  Assessment and Plan of Treatment: WHAT YOU NEED TO KNOW:  What is paresthesia? Paresthesia is numbness, tingling, or burning. It can happen in any part of your body, but usually occurs in your legs, feet, arms, or hands.  What causes paresthesia? A large number of conditions can cause paresthesia. Nerves that provide sensation are affected. Paresthesia happens because of changes in these nerves, or in nerve pathways. The changes can be temporary, such as if you take certain medicines or you are not getting enough vitamin B. Nerve damage can lead to permanent paresthesia. Conditions that may cause nerve damage include diabetes, carpel tunnel syndrome, stroke, and multiple sclerosis. The exact cause of your paresthesia may not be known.  What should I tell my healthcare provider about what I feel? You can help your healthcare provider by describing anything you feel, such as the following:  No feeling in the affected area  A feeling of pins and needles  An electric shock feeling  Heaviness  Trouble moving the affected area  A feeling that something is crawling under your skin  A feeling of burning or of cold in the affected area  How is paresthesia diagnosed? Your healthcare provider will examine you and ask about your symptoms. Tell your provider when the symptoms began. Include anything that makes your symptoms worse or better. Your provider will also need to know if you have a disease or condition that could be causing your symptoms. Tell him or her about the medicines you take. Include the amounts you take and when you take each medicine. You may also need any of the following:  Blood tests may show low levels of vitamin B or a high blood sugar level.  X-ray, MRI, or CT scan pictures may show damage to the area where you have paresthesia. You may be given contrast liquid to help the area show up better in the pictures. Tell the healthcare provider if you have ever had an allergic reaction to contrast liquid. Do not enter the MRI room with anything metal. Metal can cause severe injury. Tell the heal      SECONDARY DISCHARGE DIAGNOSES  Diagnosis: Hypertension  Assessment and Plan of Treatment: Hypertension is the medical term for high blood pressure. Blood pressure refers to the pressure that blood applies to the inner walls of the arteries. Arteries carry blood from the heart to other organs and parts of the body. Untreated high blood pressure increases the strain on the heart and arteries, eventually causing organ damage. High blood pressure increases the risk of heart failure, heart attack (myocardial infarction), stroke, and kidney failure. High blood pressure does not usually cause any symptoms. Treatment of hypertension usually begins with lifestyle changes. Making these lifestyle changes involves little or no risk. Recommended changes often include reducing the amount of salt in your diet, losing weight if you are overweight or obese, avoiding drinking too much alcohol, stopping smoking and exercising at least 30 minutes per day most days of the week. If you are prescribed medication for your hypertension it is important to take these as prescribed to prevent the possible complications of uncontrolled hypertension.      Diagnosis: Hyperlipidemia  Assessment and Plan of Treatment: You have high cholesterol. Cholesterol is a substance that is found in the blood. Everyone has some. It is needed for good health. The problem is, people sometimes have too much cholesterol. Compared with people with normal cholesterol, people with high cholesterol have a higher risk of heart attacks, strokes, and other health problems. The higher your cholesterol, the higher your risk of these problems. It is important to take your medications for high cholesterol as prescribed to prevent the complications of this condition.  You have been prescribed a cholesterol medication call atorvastatin 80mg. This medication is to help lower your cholesterol and also helps with blood vessel healing after having a stroke

## 2022-12-21 NOTE — DISCHARGE NOTE PROVIDER - NSDCMRMEDTOKEN_GEN_ALL_CORE_FT
lisinopril 5 mg oral tablet: 1 tab(s) orally once a day  omeprazole 40 mg oral delayed release capsule: 1 cap(s) orally once a day   atorvastatin 20 mg oral tablet: 1 tab(s) orally once a day (at bedtime)  lisinopril 5 mg oral tablet: 1 tab(s) orally once a day  omeprazole 40 mg oral delayed release capsule: 1 cap(s) orally once a day

## 2022-12-21 NOTE — PROGRESS NOTE ADULT - PROBLEM SELECTOR PLAN 1
sx initially concerning for stroke, but no e/o CVA on MRI brain; sx improved; cont. work-up and mgmt per Neuro; PT/OT; plan for EEG

## 2022-12-22 VITALS — TEMPERATURE: 98 F

## 2022-12-22 LAB
ANION GAP SERPL CALC-SCNC: 8 MMOL/L — SIGNIFICANT CHANGE UP (ref 5–17)
BUN SERPL-MCNC: 16 MG/DL — SIGNIFICANT CHANGE UP (ref 7–23)
CALCIUM SERPL-MCNC: 8.8 MG/DL — SIGNIFICANT CHANGE UP (ref 8.4–10.5)
CHLORIDE SERPL-SCNC: 104 MMOL/L — SIGNIFICANT CHANGE UP (ref 96–108)
CO2 SERPL-SCNC: 27 MMOL/L — SIGNIFICANT CHANGE UP (ref 22–31)
CREAT SERPL-MCNC: 0.64 MG/DL — SIGNIFICANT CHANGE UP (ref 0.5–1.3)
EGFR: 95 ML/MIN/1.73M2 — SIGNIFICANT CHANGE UP
GLUCOSE SERPL-MCNC: 102 MG/DL — HIGH (ref 70–99)
HCT VFR BLD CALC: 37.1 % — SIGNIFICANT CHANGE UP (ref 34.5–45)
HGB BLD-MCNC: 12.3 G/DL — SIGNIFICANT CHANGE UP (ref 11.5–15.5)
MAGNESIUM SERPL-MCNC: 2.1 MG/DL — SIGNIFICANT CHANGE UP (ref 1.6–2.6)
MCHC RBC-ENTMCNC: 29.4 PG — SIGNIFICANT CHANGE UP (ref 27–34)
MCHC RBC-ENTMCNC: 33.2 GM/DL — SIGNIFICANT CHANGE UP (ref 32–36)
MCV RBC AUTO: 88.8 FL — SIGNIFICANT CHANGE UP (ref 80–100)
NRBC # BLD: 0 /100 WBCS — SIGNIFICANT CHANGE UP (ref 0–0)
PHOSPHATE SERPL-MCNC: 3.4 MG/DL — SIGNIFICANT CHANGE UP (ref 2.5–4.5)
PLATELET # BLD AUTO: 260 K/UL — SIGNIFICANT CHANGE UP (ref 150–400)
POTASSIUM SERPL-MCNC: 4.2 MMOL/L — SIGNIFICANT CHANGE UP (ref 3.5–5.3)
POTASSIUM SERPL-SCNC: 4.2 MMOL/L — SIGNIFICANT CHANGE UP (ref 3.5–5.3)
RBC # BLD: 4.18 M/UL — SIGNIFICANT CHANGE UP (ref 3.8–5.2)
RBC # FLD: 12.5 % — SIGNIFICANT CHANGE UP (ref 10.3–14.5)
SODIUM SERPL-SCNC: 139 MMOL/L — SIGNIFICANT CHANGE UP (ref 135–145)
WBC # BLD: 6.29 K/UL — SIGNIFICANT CHANGE UP (ref 3.8–10.5)
WBC # FLD AUTO: 6.29 K/UL — SIGNIFICANT CHANGE UP (ref 3.8–10.5)

## 2022-12-22 PROCEDURE — 85025 COMPLETE CBC W/AUTO DIFF WBC: CPT

## 2022-12-22 PROCEDURE — 95700 EEG CONT REC W/VID EEG TECH: CPT

## 2022-12-22 PROCEDURE — 80048 BASIC METABOLIC PNL TOTAL CA: CPT

## 2022-12-22 PROCEDURE — 0042T: CPT | Mod: MA

## 2022-12-22 PROCEDURE — 85027 COMPLETE CBC AUTOMATED: CPT

## 2022-12-22 PROCEDURE — 80061 LIPID PANEL: CPT

## 2022-12-22 PROCEDURE — 80053 COMPREHEN METABOLIC PANEL: CPT

## 2022-12-22 PROCEDURE — 85610 PROTHROMBIN TIME: CPT

## 2022-12-22 PROCEDURE — 99239 HOSP IP/OBS DSCHRG MGMT >30: CPT

## 2022-12-22 PROCEDURE — 70450 CT HEAD/BRAIN W/O DYE: CPT | Mod: MA

## 2022-12-22 PROCEDURE — 99291 CRITICAL CARE FIRST HOUR: CPT | Mod: 25

## 2022-12-22 PROCEDURE — 84484 ASSAY OF TROPONIN QUANT: CPT

## 2022-12-22 PROCEDURE — 85730 THROMBOPLASTIN TIME PARTIAL: CPT

## 2022-12-22 PROCEDURE — 87635 SARS-COV-2 COVID-19 AMP PRB: CPT

## 2022-12-22 PROCEDURE — 84443 ASSAY THYROID STIM HORMONE: CPT

## 2022-12-22 PROCEDURE — 82962 GLUCOSE BLOOD TEST: CPT

## 2022-12-22 PROCEDURE — 95720 EEG PHY/QHP EA INCR W/VEEG: CPT

## 2022-12-22 PROCEDURE — 70496 CT ANGIOGRAPHY HEAD: CPT | Mod: MA

## 2022-12-22 PROCEDURE — 93306 TTE W/DOPPLER COMPLETE: CPT

## 2022-12-22 PROCEDURE — 97165 OT EVAL LOW COMPLEX 30 MIN: CPT

## 2022-12-22 PROCEDURE — 86803 HEPATITIS C AB TEST: CPT

## 2022-12-22 PROCEDURE — 97161 PT EVAL LOW COMPLEX 20 MIN: CPT

## 2022-12-22 PROCEDURE — 99232 SBSQ HOSP IP/OBS MODERATE 35: CPT

## 2022-12-22 PROCEDURE — 83735 ASSAY OF MAGNESIUM: CPT

## 2022-12-22 PROCEDURE — 83036 HEMOGLOBIN GLYCOSYLATED A1C: CPT

## 2022-12-22 PROCEDURE — 95716 VEEG EA 12-26HR CONT MNTR: CPT

## 2022-12-22 PROCEDURE — 70551 MRI BRAIN STEM W/O DYE: CPT

## 2022-12-22 PROCEDURE — 70498 CT ANGIOGRAPHY NECK: CPT | Mod: MA

## 2022-12-22 PROCEDURE — 84100 ASSAY OF PHOSPHORUS: CPT

## 2022-12-22 PROCEDURE — 36415 COLL VENOUS BLD VENIPUNCTURE: CPT

## 2022-12-22 RX ORDER — ATORVASTATIN CALCIUM 80 MG/1
1 TABLET, FILM COATED ORAL
Qty: 30 | Refills: 0
Start: 2022-12-22

## 2022-12-22 RX ADMIN — Medication 1 SPRAY(S): at 06:26

## 2022-12-22 RX ADMIN — Medication 1 SPRAY(S): at 12:46

## 2022-12-22 RX ADMIN — Medication 1 SPRAY(S): at 00:21

## 2022-12-22 RX ADMIN — ENOXAPARIN SODIUM 40 MILLIGRAM(S): 100 INJECTION SUBCUTANEOUS at 00:20

## 2022-12-22 NOTE — DISCHARGE NOTE NURSING/CASE MANAGEMENT/SOCIAL WORK - NSDCPEFALRISK_GEN_ALL_CORE
For information on Fall & Injury Prevention, visit: [email or web address] OR  [email or web address] OR  https://www.cdc.gov/steadi/patient.html

## 2022-12-22 NOTE — PROGRESS NOTE ADULT - SUBJECTIVE AND OBJECTIVE BOX
Physical Medicine and Rehabilitation Progress Note :       Patient is a 72y old  Female who presents with a chief complaint of Arm weakness and numbness (22 Dec 2022 11:31)      HPI:   **STROKE HPI***    HPI: 72y Female with PMHx of HTN, anxiety presents to ED reporting she went to bed in [facility] at 2am this morning, woke up at 4am with left arm and leg numbness and weakness. Denies any other focal neurological deficits such as vision changes, speech changes, headache, dizziness, gait changes. Stroke code called in ED. BP in /78. Initial imaging unremarkable. Found to be covid positive 4 days ago.    PAST MEDICAL & SURGICAL HISTORY:      FAMILY HISTORY:            T(C): 36.4 (12-19-22 @ 14:15), Max: 36.4 (12-19-22 @ 14:15)  HR: 87 (12-19-22 @ 14:15) (87 - 87)  BP: 161/78 (12-19-22 @ 14:15) (161/78 - 161/78)  RR: 20 (12-19-22 @ 14:15) (20 - 20)  SpO2: 98% (12-19-22 @ 14:15) (98% - 98%)    MEDICATION RECONCILIATION   MEDICATIONS  (STANDING):  aspirin  chewable 81 milliGRAM(s) Oral daily  clopidogrel Tablet 75 milliGRAM(s) Oral Once    MEDICATIONS  (PRN):    Allergies    Allergy Status Unknown    Intolerances      Vital Signs Last 24 Hrs  T(C): 36.4 (19 Dec 2022 14:15), Max: 36.4 (19 Dec 2022 14:15)  T(F): 97.6 (19 Dec 2022 14:15), Max: 97.6 (19 Dec 2022 14:15)  HR: 87 (19 Dec 2022 14:15) (87 - 87)  BP: 161/78 (19 Dec 2022 14:15) (161/78 - 161/78)  BP(mean): --  RR: 20 (19 Dec 2022 14:15) (20 - 20)  SpO2: 98% (19 Dec 2022 14:15) (98% - 98%)    Parameters below as of 19 Dec 2022 14:15  Patient On (Oxygen Delivery Method): room air     (19 Dec 2022 15:27)                            12.3   6.29  )-----------( 260      ( 22 Dec 2022 08:19 )             37.1       12-22    139  |  104  |  16  ----------------------------<  102<H>  4.2   |  27  |  0.64    Ca    8.8      22 Dec 2022 08:19  Phos  3.4     12-22  Mg     2.1     12-22      Vital Signs Last 24 Hrs  T(C): 36.6 (22 Dec 2022 13:02), Max: 37 (21 Dec 2022 21:58)  T(F): 97.9 (22 Dec 2022 13:02), Max: 98.6 (21 Dec 2022 21:58)  HR: 82 (22 Dec 2022 13:00) (64 - 89)  BP: 126/76 (22 Dec 2022 13:00) (119/60 - 144/73)  BP(mean): 96 (22 Dec 2022 13:00) (83 - 103)  RR: 16 (22 Dec 2022 13:00) (16 - 18)  SpO2: 97% (22 Dec 2022 13:00) (95% - 98%)    Parameters below as of 22 Dec 2022 13:00  Patient On (Oxygen Delivery Method): room air        MEDICATIONS  (STANDING):  atorvastatin 20 milliGRAM(s) Oral at bedtime  enoxaparin Injectable 40 milliGRAM(s) SubCutaneous every 24 hours  sodium chloride 0.65% Nasal 1 Spray(s) Both Nostrils four times a day    MEDICATIONS  (PRN):       Initial Physical / Occupational Therapy Functional Status Assessment :       Previous Level of Function:     · Ambulation Skills	independent  · Transfer Skills	independent  · ADL Skills	independent  · Additional Comments	Pt lives alone in an apt with elevator. Prior to admission, pt ambulated independently without AD.   Pt is R hand dominant and wear glasses.    Cognitive Status Examination:   · Orientation	oriented to person, place, time and situation  · Level of Consciousness	alert  · Follows Commands and Answers Questions	100% of the time; able to follow multistep instructions  · Personal Safety and Judgment	intact    Range of Motion Exam:   · Active Range of Motion Examination	AROM WFL through out    Manual Muscle Testing:   · Manual Muscle Testing Results	b/l UEs~5/5 t/o, b/l LEs~5/5 t/o    Bed Mobility: Scooting/Bridging:     · Level of Vermilion	independent    Bed Mobility: Sit to Supine:     · Level of Vermilion	not performed secondary pt left OOB sitting in the chair    Bed Mobility: Supine to Sit:     · Level of Vermilion	independent    Transfer: Sit to Stand:     · Level of Vermilion	independent  · Assistive Device	none    Transfer: Stand to Sit:     · Level of Vermilion	independent  · Assistive Device	none    Gait Skills:     · Level of Vermilion	independent  · Gait Distance	20 feet x 8 trial in the room.    Gait Analysis:     · Gait Pattern Used	swing-through gait  · Gait Deviations Noted	steady gait, no lose of balance    Stair Negotiation:     · Level of Vermilion	not performed    Balance Skills Assessment:     · Sitting Balance: Static	good balance  · Sitting Balance: Dynamic	good balance  · Sit-to-Stand Balance	good balance  · Standing Balance: Static	good balance  · Standing Balance: Dynamic	good balance    Sensory Examination:   Sensory Examination:    Light Touch Sensation:   · Left UE	mild impairment  intact except tingling from elbow down to L hand  · Right UE	within normal limits  · Left LE	mild impairment  intact except tingling from L knee down to foot.    · Coordination Assessed	finger to nose; intact bilaterally.      Proprioception:   · Coordination Assessed	finger to nose; intact bilaterally.      Treatment Location:   · Comments	CN Testing: B/L Frontalis intact; B/L buccinator intact; smile symmetrical; tongue protrusion at midline; B/L eyes open/close intact; Shoulder elevation: intact bilaterally; Vision H-Test: bilateral tracking and smooth pursuit intact except + saccades tracking from L to R; Convergence/Divergence: intact; Vision Quadrant Test: intact bilaterally    Clinical Impressions:   · Criteria for Skilled Therapeutic Interventions	rehab potential; anticipated discharge recommendation        Visual Assessment:   · Visual Acuity	not tested; wears glassess  · Visual Tracking	left to right; +nystagmus  · Visual Neglect	none  · Visual Field Cuts	none  · Eye Muscle Balance	normal  · Visual Scanning	WFL  · Visual Convergence	normal    Fine Motor Coordination:     Fine Motor Coordination:   · Left Hand, Finger to Nose	normal performance  · Right Hand, Finger to Nose	normal performance  · Left Hand Thumb/Finger Opposition Skills	normal performance  · Right Hand Thumb/Finger Opposition Skills	normal performance    Lower Body Dressing Training:     · Level of Vermilion	independent; sitting EOB      PM&R Impression : as above    Current Disposition Plan Recommendations :   d/c home with no post discharge rehab needs                 
Neurology Stroke Progress Note    INTERVAL HPI/OVERNIGHT EVENTS:  Patient seen and examined at bedside reports still having left numbness and tingling within her left arm and left toes.    MEDICATIONS  (STANDING):  aspirin  chewable 81 milliGRAM(s) Oral daily  atorvastatin 80 milliGRAM(s) Oral at bedtime  clopidogrel Tablet 75 milliGRAM(s) Oral daily  enoxaparin Injectable 40 milliGRAM(s) SubCutaneous every 24 hours  sodium chloride 0.65% Nasal 1 Spray(s) Both Nostrils four times a day    MEDICATIONS  (PRN):      Allergies    Allergy Status Unknown    Intolerances        Vital Signs Last 24 Hrs  T(C): 36.5 (20 Dec 2022 14:15), Max: 37 (19 Dec 2022 22:11)  T(F): 97.7 (20 Dec 2022 14:15), Max: 98.6 (19 Dec 2022 22:11)  HR: 77 (20 Dec 2022 16:30) (77 - 94)  BP: 156/72 (20 Dec 2022 16:30) (126/65 - 167/77)  BP(mean): 103 (20 Dec 2022 16:30) (88 - 111)  RR: 18 (20 Dec 2022 16:30) (18 - 20)  SpO2: 95% (20 Dec 2022 16:30) (95% - 99%)    Parameters below as of 20 Dec 2022 16:30  Patient On (Oxygen Delivery Method): room air        Physical exam:  Neurologic:  -Mental status: Awake, alert, oriented to person, place, and time. Speech is fluent with intact naming, repetition, and comprehension, no dysarthria. Recent and remote memory intact. Follows commands. Attention/concentration intact.   -Cranial nerves:   II: Visual fields are full to confrontation.  III, IV, VI: Extraocular movements are intact without nystagmus. Pupils equally round and reactive to light  V:  Facial sensation V1-V3 equal and intact   VII: Face is symmetric with normal eye closure and smile  VIII: Hearing is bilaterally intact   Motor: Normal bulk and tone. No pronator drift. Strength bilateral upper extremity 5/5, bilateral lower extremities 5/5.  Sensation: Intact to light touch bilaterally. Feels tingling within her left hand and left toes to left calf.   Coordination: No dysmetria on finger-to-nose bilaterally  Gait: Narrow gait and steady    LABS:                        11.6   4.73  )-----------( 221      ( 20 Dec 2022 05:30 )             34.2     12-20    140  |  104  |  18  ----------------------------<  104<H>  4.2   |  25  |  0.72    Ca    8.6      20 Dec 2022 05:30  Phos  4.0     12-20  Mg     2.0     12-20    TPro  7.8  /  Alb  4.3  /  TBili  0.2  /  DBili  x   /  AST  34  /  ALT  21  /  AlkPhos  76  12-19    PT/INR - ( 19 Dec 2022 14:37 )   PT: 12.1 sec;   INR: 1.02          PTT - ( 19 Dec 2022 14:37 )  PTT:28.3 sec      RADIOLOGY & ADDITIONAL TESTS:    
Neurology Stroke Progress Note    INTERVAL HPI/OVERNIGHT EVENTS:  Patient seen and examined. Patient with MRI negative for stroke. On more thorough discussion with the patient she describes that the tingling began in her lower leg and traveled up her body over time. Given the chronology of her symptoms less concerning for TIA, plan for EEG placement.     MEDICATIONS  (STANDING):  atorvastatin 20 milliGRAM(s) Oral at bedtime  enoxaparin Injectable 40 milliGRAM(s) SubCutaneous every 24 hours  sodium chloride 0.65% Nasal 1 Spray(s) Both Nostrils four times a day    MEDICATIONS  (PRN):      Allergies    Allergy Status Unknown    Intolerances        Vital Signs Last 24 Hrs  T(C): 36.7 (21 Dec 2022 16:37), Max: 36.9 (21 Dec 2022 14:18)  T(F): 98.1 (21 Dec 2022 16:37), Max: 98.4 (21 Dec 2022 14:18)  HR: 68 (21 Dec 2022 15:00) (64 - 89)  BP: 136/72 (21 Dec 2022 15:00) (128/68 - 141/67)  BP(mean): 98 (21 Dec 2022 15:00) (93 - 98)  RR: 16 (21 Dec 2022 15:00) (16 - 18)  SpO2: 95% (21 Dec 2022 04:45) (95% - 96%)    Parameters below as of 21 Dec 2022 15:00  Patient On (Oxygen Delivery Method): room air        Physical exam:  Neurologic:  -Mental status: Awake, alert, oriented to person, place, and time. Speech is fluent with intact naming, repetition, and comprehension, no dysarthria. Recent and remote memory intact. Follows commands. Attention/concentration intact.   -Cranial nerves:   II: Visual fields are full to confrontation.  III, IV, VI: Extraocular movements are intact without nystagmus. Pupils equally round and reactive to light  V:  Facial sensation V1-V3 equal and intact   VII: Face is symmetric with normal eye closure and smile  Motor: Normal bulk and tone. No pronator drift. Strength bilateral upper extremity 5/5, bilateral lower extremities 5/5.  Sensation: Intact to light touch bilaterally. Feels tingling within her left hand and left toes to left calf.   Coordination: No dysmetria on finger-to-nose bilaterally    LABS:                        12.2   4.86  )-----------( 253      ( 21 Dec 2022 05:30 )             36.7     12-21    140  |  104  |  18  ----------------------------<  97  4.4   |  26  |  0.66    Ca    9.1      21 Dec 2022 05:30  Phos  3.8     12-21  Mg     2.0     12-21            RADIOLOGY & ADDITIONAL TESTS:    < from: MR Head No Cont (12.20.22 @ 19:13) >    IMPRESSION:    No recent infarct or other focal finding    --- End of Report ---    < end of copied text >  
Neurology Stroke Progress Note    INTERVAL HPI/OVERNIGHT EVENTS:  Patient seen and examined at bedside reports still having left numbness and tingling within her left arm and left toes.    MEDICATIONS  (STANDING):  aspirin  chewable 81 milliGRAM(s) Oral daily  atorvastatin 80 milliGRAM(s) Oral at bedtime  clopidogrel Tablet 75 milliGRAM(s) Oral daily  enoxaparin Injectable 40 milliGRAM(s) SubCutaneous every 24 hours  sodium chloride 0.65% Nasal 1 Spray(s) Both Nostrils four times a day    MEDICATIONS  (PRN):      Allergies    Allergy Status Unknown    Intolerances        Vital Signs Last 24 Hrs  T(C): 36.5 (20 Dec 2022 14:15), Max: 37 (19 Dec 2022 22:11)  T(F): 97.7 (20 Dec 2022 14:15), Max: 98.6 (19 Dec 2022 22:11)  HR: 77 (20 Dec 2022 16:30) (77 - 94)  BP: 156/72 (20 Dec 2022 16:30) (126/65 - 167/77)  BP(mean): 103 (20 Dec 2022 16:30) (88 - 111)  RR: 18 (20 Dec 2022 16:30) (18 - 20)  SpO2: 95% (20 Dec 2022 16:30) (95% - 99%)    Parameters below as of 20 Dec 2022 16:30  Patient On (Oxygen Delivery Method): room air        Physical exam:  Neurologic:  -Mental status: Awake, alert, oriented to person, place, and time. Speech is fluent with intact naming, repetition, and comprehension, no dysarthria. Recent and remote memory intact. Follows commands. Attention/concentration intact.   -Cranial nerves:   II: Visual fields are full to confrontation.  III, IV, VI: Extraocular movements are intact without nystagmus. Pupils equally round and reactive to light  V:  Facial sensation V1-V3 equal and intact   VII: Face is symmetric with normal eye closure and smile  VIII: Hearing is bilaterally intact   Motor: Normal bulk and tone. No pronator drift. Strength bilateral upper extremity 5/5, bilateral lower extremities 5/5.  Sensation: Intact to light touch bilaterally. Feels tingling within her left hand and left toes to left calf.   Coordination: No dysmetria on finger-to-nose bilaterally  Gait: Narrow gait and steady    LABS:                        11.6   4.73  )-----------( 221      ( 20 Dec 2022 05:30 )             34.2     12-20    140  |  104  |  18  ----------------------------<  104<H>  4.2   |  25  |  0.72    Ca    8.6      20 Dec 2022 05:30  Phos  4.0     12-20  Mg     2.0     12-20    TPro  7.8  /  Alb  4.3  /  TBili  0.2  /  DBili  x   /  AST  34  /  ALT  21  /  AlkPhos  76  12-19    PT/INR - ( 19 Dec 2022 14:37 )   PT: 12.1 sec;   INR: 1.02          PTT - ( 19 Dec 2022 14:37 )  PTT:28.3 sec      RADIOLOGY & ADDITIONAL TESTS:    < from: Echocardiogram w/ Bubble and Doppler (12.20.22 @ 09:41) >  CONCLUSIONS:     1. Limited study obtained for evaluation of left ventricular function.   2. On limited views, overall left ventricular systolic function is   grossly normal, estimated ejection fraction 55-60%.   3. Normal right ventricular size and systolic function.   4. Injection of agitated saline via a peripheral vein reveals bubbles in   the left heart within 3-5 heart beats, most consistent with a patent   foramen ovale.   5. There is at least mild mitral regurgitation.   6. Trivial pericardial effusion.   7. No prior echo is available for comparison.    < end of copied text >  
  Patient is a 72y old  Female who presents with a chief complaint of left arm and leg numbness and weakness (21 Dec 2022 10:35)      INTERVAL HPI/OVERNIGHT EVENTS:    Pt. seen and examined earlier today  Pt. c/o sensitive hearing, but reports this sx is chronic  Pt. reports L-sided numbness is better  Pt. denies F/C, CP, SOB, cough    Review of Systems: 12 point review of systems otherwise negative    MEDICATIONS  (STANDING):  aspirin  chewable 81 milliGRAM(s) Oral daily  atorvastatin 80 milliGRAM(s) Oral at bedtime  clopidogrel Tablet 75 milliGRAM(s) Oral daily  enoxaparin Injectable 40 milliGRAM(s) SubCutaneous every 24 hours  sodium chloride 0.65% Nasal 1 Spray(s) Both Nostrils four times a day    MEDICATIONS  (PRN):      Allergies    Allergy Status Unknown    Intolerances          Vital Signs Last 24 Hrs  T(C): 36.8 (21 Dec 2022 09:18), Max: 36.8 (20 Dec 2022 21:42)  T(F): 98.2 (21 Dec 2022 09:18), Max: 98.2 (20 Dec 2022 21:42)  HR: 64 (21 Dec 2022 04:45) (64 - 90)  BP: 128/68 (21 Dec 2022 04:45) (128/68 - 159/83)  BP(mean): 93 (21 Dec 2022 04:45) (93 - 111)  RR: 18 (21 Dec 2022 04:45) (18 - 18)  SpO2: 95% (21 Dec 2022 04:45) (95% - 99%)    Parameters below as of 21 Dec 2022 04:45  Patient On (Oxygen Delivery Method): room air      CAPILLARY BLOOD GLUCOSE          12-20 @ 07:01  -  12-21 @ 07:00  --------------------------------------------------------  IN: 560 mL / OUT: 0 mL / NET: 560 mL    12-21 @ 07:01  -  12-21 @ 12:55  --------------------------------------------------------  IN: 180 mL / OUT: 0 mL / NET: 180 mL        Physical Exam:  (earlier today)  Daily     Daily   General:  comfortable-appearing in NAD  HEENT:  MMM +EEG  CV:  RRR  Lungs:  CTA B/L  Abdomen:  soft NT ND  Extremities:  no edema B/L LE  Skin:  WWP  Neuro:  AAOx3, no dysarthria     LABS:                        12.2   4.86  )-----------( 253      ( 21 Dec 2022 05:30 )             36.7     12-21    140  |  104  |  18  ----------------------------<  97  4.4   |  26  |  0.66    Ca    9.1      21 Dec 2022 05:30  Phos  3.8     12-21  Mg     2.0     12-21    TPro  7.8  /  Alb  4.3  /  TBili  0.2  /  DBili  x   /  AST  34  /  ALT  21  /  AlkPhos  76  12-19    PT/INR - ( 19 Dec 2022 14:37 )   PT: 12.1 sec;   INR: 1.02          PTT - ( 19 Dec 2022 14:37 )  PTT:28.3 sec

## 2022-12-22 NOTE — CONSULT NOTE ADULT - ASSESSMENT
73 y/o F w/     Problem/Plan - 1:  ·  Problem: Left sided numbness.   ·  Plan: sx initially concerning for stroke, but no e/o CVA on MRI brain; sx improved; cont. work-up and mgmt per Neuro; PT/OT; follow-up EEG     Problem/Plan - 2:  ·  Problem: PFO (patent foramen ovale).   ·  Plan: f/u B/L LE doppler U/S, r/o DVT.     Problem/Plan - 3:  ·  Problem: COVID-19. Patient maintaining saturation on RA, denies cough, no chest pain  ·  Plan: cont. supportive care, contact + airborne precautions; not hypoxic, no indication for steroids or remdesivir. AC per protocol      Problem/Plan - 4:  ·  Problem: Essential hypertension.   ·  Plan: BP goal per Neuro.     Problem/Plan - 5:  ·  Problem: HLD (hyperlipidemia).   ·  Plan: , cont. statin.   71 y/o F w/     Problem/Plan - 1:  ·  Problem: Left sided numbness.   ·  Plan: sx initially concerning for stroke, but no e/o CVA on MRI brain; sx improved; cont. work-up and mgmt per Neuro; PT/OT; follow-up EEG     Problem/Plan - 2:  ·  Problem: PFO (patent foramen ovale).   ·  Plan: f/u B/L LE doppler U/S, r/o DVT.     Problem/Plan - 3:  ·  Problem: COVID-19. Patient maintaining saturation on RA, denies cough, no chest pain  ·  Plan: cont. supportive care, contact + airborne precautions; not hypoxic, no indication for steroids or remdesivir. AC per protocol      Problem/Plan - 4:  ·  Problem: Essential hypertension.   ·  Plan: BP goal per Neuro.     Problem/Plan - 5:  ·  Problem: HLD (hyperlipidemia).   ·  Plan: , cont. statin.    Discussed with primary team. Thank you for your consult.

## 2022-12-22 NOTE — DISCHARGE NOTE NURSING/CASE MANAGEMENT/SOCIAL WORK - PATIENT PORTAL LINK FT
You can access the FollowMyHealth Patient Portal offered by [facility] by registering at the following website: [email or web address]. By joining [employer]’s FollowMyHealth portal, you will also be able to view your health information using other applications (apps) compatible with our system.

## 2022-12-22 NOTE — CONSULT NOTE ADULT - SUBJECTIVE AND OBJECTIVE BOX
Patient is a 72y old  Female who presents with a chief complaint of left arm and leg numbness and weakness (21 Dec 2022 10:35)    SUBJECTIVE:  Patient was seen and examined at bedside. Reports feeling better, denies SOB, no chest pain. Denies weakness or numbness. Denies other complaints.     Review of systems: No fever, chills, dizziness, HA, Changes in vision, CP, dyspnea, nausea or vomiting, dysuria, changes in bowel movements, LE edema. Rest of 12 point Review of systems negative unless otherwise documented elsewhere in note.     Diet, DASH/TLC:   Sodium & Cholesterol Restricted (12-19-22 @ 21:35) [Active]      MEDICATIONS:  MEDICATIONS  (STANDING):  atorvastatin 20 milliGRAM(s) Oral at bedtime  enoxaparin Injectable 40 milliGRAM(s) SubCutaneous every 24 hours  sodium chloride 0.65% Nasal 1 Spray(s) Both Nostrils four times a day    MEDICATIONS  (PRN):      Allergies    Allergy Status Unknown    Intolerances        OBJECTIVE:  Vital Signs Last 24 Hrs  T(C): 36.2 (22 Dec 2022 10:03), Max: 37 (21 Dec 2022 21:58)  T(F): 97.1 (22 Dec 2022 10:03), Max: 98.6 (21 Dec 2022 21:58)  HR: 74 (22 Dec 2022 08:49) (64 - 89)  BP: 144/73 (22 Dec 2022 08:49) (119/60 - 144/73)  BP(mean): 103 (22 Dec 2022 08:49) (83 - 103)  RR: 17 (22 Dec 2022 08:49) (16 - 18)  SpO2: 98% (22 Dec 2022 08:49) (95% - 98%)    Parameters below as of 22 Dec 2022 08:49  Patient On (Oxygen Delivery Method): room air      I&O's Summary    21 Dec 2022 07:01  -  22 Dec 2022 07:00  --------------------------------------------------------  IN: 420 mL / OUT: 0 mL / NET: 420 mL    22 Dec 2022 07:01  -  22 Dec 2022 11:32  --------------------------------------------------------  IN: 225 mL / OUT: 0 mL / NET: 225 mL        PHYSICAL EXAM:  General: AOX3, NAD, ambulatory, speaking in full sentences, no labored breathing on RA  HEENT: AT/NC, no facial asymmetry, hard hearing   Lungs: poor inspiration, no crackles, no wheezes  Heart: RRR  Abdomen: soft, non-tender, + BS  Extremities: warm, no edema, no tenderness, no focal deficit.     LABS:                        12.3   6.29  )-----------( 260      ( 22 Dec 2022 08:19 )             37.1     12-22    139  |  104  |  16  ----------------------------<  102<H>  4.2   |  27  |  0.64    Ca    8.8      22 Dec 2022 08:19  Phos  3.4     12-22  Mg     2.1     12-22        
    Patient is a 72y old  Female who presents with a chief complaint of       HPI:   **STROKE HPI***    HPI: 72y Female with PMHx of HTN, anxiety presents to ED reporting she went to bed in [facility] [place] at 2am this morning, woke up at 4am with left arm and leg numbness and weakness. Denies any other focal neurological deficits such as vision changes, speech changes, headache, dizziness, gait changes. Stroke code called in ED. BP in /78. Initial imaging unremarkable. Found to be covid positive 4 days ago.    PAST MEDICAL & SURGICAL HISTORY:      FAMILY HISTORY:            T(C): 36.4 (12-19-22 @ 14:15), Max: 36.4 (12-19-22 @ 14:15)  HR: 87 (12-19-22 @ 14:15) (87 - 87)  BP: 161/78 (12-19-22 @ 14:15) (161/78 - 161/78)  RR: 20 (12-19-22 @ 14:15) (20 - 20)  SpO2: 98% (12-19-22 @ 14:15) (98% - 98%)    MEDICATION RECONCILIATION   MEDICATIONS  (STANDING):  aspirin  chewable 81 milliGRAM(s) Oral daily  clopidogrel Tablet 75 milliGRAM(s) Oral Once    MEDICATIONS  (PRN):    Allergies    Allergy Status Unknown    Intolerances      Vital Signs Last 24 Hrs  T(C): 36.4 (19 Dec 2022 14:15), Max: 36.4 (19 Dec 2022 14:15)  T(F): 97.6 (19 Dec 2022 14:15), Max: 97.6 (19 Dec 2022 14:15)  HR: 87 (19 Dec 2022 14:15) (87 - 87)  BP: 161/78 (19 Dec 2022 14:15) (161/78 - 161/78)  BP(mean): --  RR: 20 (19 Dec 2022 14:15) (20 - 20)  SpO2: 98% (19 Dec 2022 14:15) (98% - 98%)    Parameters below as of 19 Dec 2022 14:15  Patient On (Oxygen Delivery Method): room air     (19 Dec 2022 15:27)      PAST MEDICAL & SURGICAL HISTORY:  HTN (hypertension)          MEDICATIONS  (STANDING):  aspirin  chewable 81 milliGRAM(s) Oral daily  atorvastatin 80 milliGRAM(s) Oral at bedtime  clopidogrel Tablet 75 milliGRAM(s) Oral daily  enoxaparin Injectable 40 milliGRAM(s) SubCutaneous every 24 hours  sodium chloride 0.65% Nasal 1 Spray(s) Both Nostrils four times a day    MEDICATIONS  (PRN):           FAMILY HISTORY:    CBC Full  -  ( 20 Dec 2022 05:30 )  WBC Count : 4.73 K/uL  RBC Count : 3.86 M/uL  Hemoglobin : 11.6 g/dL  Hematocrit : 34.2 %  Platelet Count - Automated : 221 K/uL  Mean Cell Volume : 88.6 fl  Mean Cell Hemoglobin : 30.1 pg  Mean Cell Hemoglobin Concentration : 33.9 gm/dL  Auto Neutrophil # : x  Auto Lymphocyte # : x  Auto Monocyte # : x  Auto Eosinophil # : x  Auto Basophil # : x  Auto Neutrophil % : x  Auto Lymphocyte % : x  Auto Monocyte % : x  Auto Eosinophil % : x  Auto Basophil % : x      12-20    140  |  104  |  18  ----------------------------<  104<H>  4.2   |  25  |  0.72    Ca    8.6      20 Dec 2022 05:30  Phos  4.0     12-20  Mg     2.0     12-20    TPro  7.8  /  Alb  4.3  /  TBili  0.2  /  DBili  x   /  AST  34  /  ALT  21  /  AlkPhos  76  12-19            Radiology :     < from: CT Brain Stroke Protocol (12.19.22 @ 14:32) >  ACC: [id number] EXAM:  CT BRAIN STROKE PROTOCOL                          PROCEDURE DATE:  12/19/2022          INTERPRETATION:  PROCEDURE: CT head without intravenous contrast    CLINICAL INDICATION: Left arm and leg numbness and weakness. Stroke code.    TECHNIQUE: Axial CT imaging of the brain is obtained with multiplanar   images reviewed and displayed with both bone and soft tissue algorithm.   Rapid-AppSurfer software is utilized for preliminary hemorrhage detection.    COMPARISON: None    FINDINGS:    Ventricles, cisterns and sulci are unremarkable. No hydrocephalus, mass   effect or midline shift.    No acute intracranial hemorrhage or extra-axial fluid collection.    Gray to white differentiation appears preserved, without CT evidence of   recent transcortical or territorial infarct.    Bone algorithm images show no calvarial fracture or acute abnormality of   the calvarium or skull base. Paranasal sinuses included on this scan show   mild scattered mucosal thickening and trace fluid. Mastoids are clear.      IMPRESSION:  No acute intracranial hemorrhage, mass effect CT, or demarcated recent   infarct.      < from: CT Brain Perfusion Maps Stroke (12.19.22 @ 14:34) >    ACC: [id number] EXAM:  CT BRAIN PERFUSION MAPS STROKE                          PROCEDURE DATE:  12/19/2022          INTERPRETATION:  INDICATION: Left arm and leg numbness and weakness.   Stroke code.    TECHNIQUE: After the bolus administration of 40cc Isovue-370, CT   perfusion is obtained as per [facility] protocol. Perfusion maps   are provided.    FINDINGS:    There is no reported deficit of CBF< 30% or Tmax > 6 seconds nor mismatch   to report. Review of color maps shows gross symmetry without territorial   zone of differing parameters.      IMPRESSION:    Negative CT perfusion of the brain.      < from: CT Angio Brain Stroke Protocol  w/ IV Cont (12.19.22 @ 14:36) >  ACC: [id number] EXAM:  CT ANGIO NECK STROKE PROTCL IC                        ACC: [id number] EXAM:  CT ANGIO BRAIN STROKE PROTC IC                          PROCEDURE DATE:  12/19/2022          INTERPRETATION:  PROCEDURES:  CTA brain with intravenous contrast.  CTA neck with intravenous contrast.    INDICATION: Left arm and leg numbness and weakness. Stroke code.    TECHNIQUE: Multiple axial thin section were obtained from the aortic arch   through the neck and intracranial compartment up to the calvarial vertex.   Imaging is done after the IV bolus of 80 cc Isovue-370. MIP series are   provided.    COMPARISON: None    FINDINGS:    INTRACRANIAL:  The internal carotid arteries are patent at the skull base and   intracranial compartment without occlusion or high grade stenosis despite   mild atherosclerotic calcification. The anterior and middle cerebral   arteries are patent at their 1st and 2nd order segments, and appear   symmetric caliber. The posterior circulation shows no high grade stenosis   or occlusion. The intracranial vertebral arteries, the basilar artery and   both posterior cerebral arteries are patent. There is no site of aneurysm   within the resolution limitations of CTA.    EXTRACRANIAL:    The aortic arch is normal sizeand shows patency, with standard 3 vessel   configuration. No significant great vessel stenosis.    Both common carotid arteries are patent to the bifurcations where there   is small volume mixed density plaque. Both internal carotid arteries show   preserved patency in the neck without stenosis or evidence of dissection.    Vertebral arteries are patent at their origins and throughout their   course in the neck without stenosis or evidence of dissection. The left   side is dominant.    Thyroid gland is mildly enlarged/nodular.      IMPRESSION:    Intracranial CTA: No arterial steno-occlusive focus.    Extracranial CTA: No carotid or vertebral artery stenosis or evidence of   dissection.          Vital Signs Last 24 Hrs  T(C): 36.3 (20 Dec 2022 04:58), Max: 37 (19 Dec 2022 22:11)  T(F): 97.3 (20 Dec 2022 04:58), Max: 98.6 (19 Dec 2022 22:11)  HR: 78 (20 Dec 2022 04:58) (77 - 94)  BP: 131/77 (20 Dec 2022 04:58) (128/69 - 167/77)  BP(mean): 99 (20 Dec 2022 04:58) (93 - 111)  RR: 20 (20 Dec 2022 04:58) (17 - 20)  SpO2: 96% (20 Dec 2022 04:58) (96% - 98%)    Parameters below as of 20 Dec 2022 04:58  Patient On (Oxygen Delivery Method): room air            REVIEW OF SYSTEMS:      CONSTITUTIONAL: No fever, weight loss, or fatigue  EYES: No eye pain, visual disturbances, or discharge  ENMT:  No difficulty hearing, tinnitus, vertigo; No sinus or throat pain  NECK: No pain or stiffness  BREASTS: No pain, masses, or nipple discharge  RESPIRATORY: No cough, wheezing, chills or hemoptysis; No shortness of breath  CARDIOVASCULAR: No chest pain, palpitations, dizziness, or leg swelling  GASTROINTESTINAL: No abdominal or epigastric pain. No nausea, vomiting, or hematemesis; No diarrhea or constipation. No melena or hematochezia.  GENITOURINARY: No dysuria, frequency, hematuria, or incontinence  NEUROLOGICAL:  per hpi   SKIN: No itching, burning, rashes, or lesions   LYMPH NODES: No enlarged glands  ENDOCRINE: No heat or cold intolerance; No hair loss  MUSCULOSKELETAL: No joint pain or swelling; No muscle, back, or extremity pain  PSYCHIATRIC: No depression, anxiety, mood swings, or difficulty sleeping  HEME/LYMPH: No easy bruising, or bleeding gums  ALLERGY AND IMMUNOLOGIC: No hives or eczema  VASCULAR: no swelling , erythema        Physical Exam:  on COVID isolation  , 72 y o woman  lying comfortably in semi Peterson's position , awake , alert , no acute complaints     Head : normocephalic , atraumatic    Eyes : PERRLA , EOMI , no nystagmus , sclera anicteric    ENT : nasal discharge , uvula midline , no oropharyngeal erythema / exudate    Neck : supple , negative JVD , negative carotid bruits , no thyromegaly    Chest : CTA bilaterally , neg wheeze / rhonchi / rales / crackles / egophany    Cardiovascular: regular rate and rhythm , neg murmurs / rubs / gallops    Abdomen : soft , non distended , non tender to palpation in all 4 quadrants , negative rebound / guarding , normal bowel sounds    Extremities : WWP , neg cyanosis /clubbing / edema     Neurologic Exam:    Alert and oriented to person , place , date/year, speech fluent w/o dysarthria , follows commands      Cranial Nerves:     II :                         pupils equal , round and reactive to light , visual fields intact   III/ IV/VI :              extraocular movements intact , neg nystagmus , neg ptosis  V :                        facial sensation intact , V1-3 normal  VII :                      face symmetric , no droop , normal eye closure and smile  VIII :                     hearing intact to finger rub bilaterally  IX and X :             no hoarseness , gag intact , palate/ uvula rise symmetrically  XI :                       SCM / trapezius strength intact bilateral  XII :                      no tongue deviation    Motor Exam:          Right UE:               no focal weakness ,  > 4/5 throughout  , no drift                                Left UE:                 no focal weakness,  > 4/5 throughout  , no drift         Right LE:                no focal weakness,  > 4/5 throughout      Left LE:                  no focal weakness,  > 4/5 throughout             Sensation:         intact to light touch x 4 extremities                         no neglect or extinction on double simultaneous testing                       DTR :                     biceps/brachioradialis : equal                                              patella/ankle : equal                                                                               neg Babinski         Coordination :      Finger to Nose :  neg dysmetria bilaterally     Gait :  not tested        PM&R Impression :     1) admitted for c/o    2) no acute pathology on CT brain imaging     3) no focal neurologic deficts       Recommendations / Plan :     1) Physical / Occupational therapy focusing on therapeutic exercises , equipment evaluation , bed mobility/transfer out of bed evaluation , progressive ambulation with assistive devices prn .    2) Current disposition plan recommendation  :    pending functional progress          
  Patient is a 72y old  Female who presents with a chief complaint of     HPI:   **STROKE HPI***    HPI: 72y Female with PMHx of HTN, anxiety presents to ED reporting she went to bed in [facility] [place] at 2am this morning, woke up at 4am with left arm and leg numbness and weakness. Denies any other focal neurological deficits such as vision changes, speech changes, headache, dizziness, gait changes. Stroke code called in ED. BP in /78. Initial imaging unremarkable. Found to be covid positive 4 days ago.   (19 Dec 2022 15:27)    Review of Systems: 12 point review of systems otherwise negative    PAST MEDICAL & SURGICAL HISTORY:  HTN (hypertension)    Social History:  Patient lives alone  Smoking status: denies  Drinking: denies  Drug Use: denies (19 Dec 2022 15:27)    FAMILY HISTORY:  no documented h/o CVA in first-degree relatives    MEDICATIONS  (STANDING):  aspirin  chewable 81 milliGRAM(s) Oral daily  atorvastatin 80 milliGRAM(s) Oral at bedtime  clopidogrel Tablet 75 milliGRAM(s) Oral daily  enoxaparin Injectable 40 milliGRAM(s) SubCutaneous every 24 hours  sodium chloride 0.65% Nasal 1 Spray(s) Both Nostrils four times a day    MEDICATIONS  (PRN):      Allergies    Allergy Status Unknown    Intolerances          Vital Signs Last 24 Hrs  T(C): 36.6 (20 Dec 2022 17:30), Max: 37 (19 Dec 2022 22:11)  T(F): 97.9 (20 Dec 2022 17:30), Max: 98.6 (19 Dec 2022 22:11)  HR: 77 (20 Dec 2022 16:30) (77 - 94)  BP: 156/72 (20 Dec 2022 16:30) (126/65 - 167/77)  BP(mean): 103 (20 Dec 2022 16:30) (88 - 111)  RR: 18 (20 Dec 2022 16:30) (18 - 20)  SpO2: 95% (20 Dec 2022 16:30) (95% - 99%)    Parameters below as of 20 Dec 2022 16:30  Patient On (Oxygen Delivery Method): room air      CAPILLARY BLOOD GLUCOSE          12-20 @ 07:01  -  12-20 @ 19:42  --------------------------------------------------------  IN: 560 mL / OUT: 0 mL / NET: 560 mL        Physical Exam:  (earlier today)  Daily Height in cm: 154 (19 Dec 2022 20:50)    Daily   General:  comfortable-appearing in NAD  HEENT:  MMM  CV:  RRR  Lungs:  CTA B/L anteriorly  Abdomen:  soft NT ND  Skin:  WWP  Neuro:  sleeping    LABS:                        11.6   4.73  )-----------( 221      ( 20 Dec 2022 05:30 )             34.2     12-20    140  |  104  |  18  ----------------------------<  104<H>  4.2   |  25  |  0.72    Ca    8.6      20 Dec 2022 05:30  Phos  4.0     12-20  Mg     2.0     12-20    TPro  7.8  /  Alb  4.3  /  TBili  0.2  /  DBili  x   /  AST  34  /  ALT  21  /  AlkPhos  76  12-19    PT/INR - ( 19 Dec 2022 14:37 )   PT: 12.1 sec;   INR: 1.02          PTT - ( 19 Dec 2022 14:37 )  PTT:28.3 sec

## 2022-12-22 NOTE — PROGRESS NOTE ADULT - ASSESSMENT
72y Female with PMHx of HTN, anxiety presents to ED reporting she went to bed in [facility] [place] at 2am this morning, woke up at 4am with left arm and leg numbness and weakness. Stroke code called in ED. Initial imaging unremarkable. NIHSS 0. Not a candidate for tpa as there is no disabling deficit. Not a candidate for thrombectomy as no LVO on CTA. Found to be covid positive 4 days ago. Admitted for further stroke workup.     Neuro  #CVA workup  - continue aspirin 81mg and plavix 75mg daily  - continue atorvastatin 80mg daily  - q4hr stroke neuro checks and vitals  - obtain MRI Brain without contrast  - Stroke Code HCT Results: neg  - Stroke Code CTA Results: neg  - Stroke education    Cards  #HTN  - permissive hypertension, Goal -180  - hold home blood pressure medication for now  - TTE: limited study, grossly normal left ventricular function. +PFO.  - Stroke Code EKG Results: NSR      - high dose statin as above in CVA  - LDL results: 114    Pulm  - call provider if SPO2 < 94%    GI  #Nutrition/Fluids/Electrolytes   - replete K<4 and Mg <2  - Diet: regular   - IVF: none indicated at this time    Renal  - trend BMP    Infectious Disease  - Stroke Code CXR results: pending    # covid   - f/u CXR  - monitor sats  - Sodium chloride nasal spray for congestion  - isolation protocol    Endocrine  - A1C results: 5.5  - TSH results: 1.09    DVT Prophylaxis  - lovenox sq for DVT prophylaxis   - SCDs for DVT prophylaxis  - Consider LE dopplers pending MRI results     Dispo: pending PT/OT eval     Discussed daily hospital plans and goals with patient at bedside.    Discussed with Neurology Attending Dr. Stokes  
72y Female with PMHx of HTN, anxiety presents to ED reporting she went to bed in [facility] [place] at 2am this morning, woke up at 4am with left arm and leg numbness and weakness. Stroke code called in ED. Initial imaging unremarkable. NIHSS 0. Not a candidate for tpa as there is no disabling deficit. Not a candidate for thrombectomy as no LVO on CTA. Found to be covid positive 4 days ago. Admitted for further stroke workup. Limited echo found to be positive for PFO. MRI negative for acute stroke. Upon further discussion with patient stroke less likely in the setting of tingling that started in her foot and traveled up the left side of her body. Plan for EEG today.      Neuro  #CVA workup  - DAPT d/c'd  - continue atorvastatin 20mg daily  - q4hr stroke neuro checks and vitals  - MRI Brain without contrast: negative for stroke  - EEG in place, f/u read in AM  - Stroke Code HCT Results: neg  - Stroke Code CTA Results: neg  - Stroke education    Cards  #HTN  - goal normotension  - hold home blood pressure medication for now  - TTE: limited study, grossly normal left ventricular function. +PFO.  - Stroke Code EKG Results: NSR      - high dose statin as above in CVA  - LDL results: 114    Pulm  - call provider if SPO2 < 94%    GI  #Nutrition/Fluids/Electrolytes   - replete K<4 and Mg <2  - Diet: regular   - IVF: none indicated at this time    Renal  - trend BMP    Infectious Disease    # covid   - saturating in 90's on room air  - Sodium chloride nasal spray for congestion  - isolation protocol    Endocrine  - A1C results: 5.5  - TSH results: 1.09    DVT Prophylaxis  - lovenox sq for DVT prophylaxis   - SCDs for DVT prophylaxis       Dispo: home no needs     Discussed daily hospital plans and goals with patient at bedside.    Discussed with Neurology Attending Dr. Stokes  
73 y/o F w/
per Neurology    72 y o Female with PMHx of HTN, anxiety presents to ED reporting [place] [facility], woke up at 4am with left arm and leg numbness and weakness. Stroke code called in ED. Initial imaging unremarkable. NIHSS 0. Not a candidate for tpa as there is no disabling deficit. Not a candidate for thrombectomy as no LVO on CTA. Found to be covid positive 4 days ago. Admitted for further stroke workup. Limited echo found to be positive for PFO. MRI negative for acute stroke. Upon further discussion with patient stroke less likely in the setting of tingling that started in her foot and traveled up the left side of her body. Plan for EEG today.      Neuro  #CVA workup  - DAPT d/c'd  - continue atorvastatin 20mg daily  - q4hr stroke neuro checks and vitals  - MRI Brain without contrast: negative for stroke  - EEG in place, f/u read in AM  - Stroke Code HCT Results: neg  - Stroke Code CTA Results: neg  - Stroke education    Cards  #HTN  - goal normotension  - hold home blood pressure medication for now  - TTE: limited study, grossly normal left ventricular function. +PFO.  - Stroke Code EKG Results: NSR      - high dose statin as above in CVA  - LDL results: 114    Pulm  - call provider if SPO2 < 94%    GI  #Nutrition/Fluids/Electrolytes   - replete K<4 and Mg <2  - Diet: regular   - IVF: none indicated at this time    Renal  - trend BMP    Infectious Disease    # covid   - saturating in 90's on room air  - Sodium chloride nasal spray for congestion  - isolation protocol    Endocrine  - A1C results: 5.5  - TSH results: 1.09    DVT Prophylaxis  - lovenox sq for DVT prophylaxis   - SCDs for DVT prophylaxis
72y Female with PMHx of HTN, anxiety presents to ED reporting she went to bed in [facility] at 2am this morning, woke up at 4am with left arm and leg numbness and weakness. Stroke code called in ED. Initial imaging unremarkable. NIHSS 0. Not a candidate for tpa as there is no disabling deficit. Not a candidate for thrombectomy as no LVO on CTA. Found to be covid positive 4 days ago. Admitted for further stroke workup. Limited echo found to be positive for PFO. Now pending MRI brain.     Neuro  #CVA workup  - continue aspirin 81mg and plavix 75mg daily  - continue atorvastatin 80mg daily  - q4hr stroke neuro checks and vitals  - obtain MRI Brain without contrast  - Stroke Code HCT Results: neg  - Stroke Code CTA Results: neg  - Stroke education    Cards  #HTN  - permissive hypertension, Goal -180  - hold home blood pressure medication for now  - TTE: limited study, grossly normal left ventricular function. +PFO.  - Stroke Code EKG Results: NSR      - high dose statin as above in CVA  - LDL results: 114    Pulm  - call provider if SPO2 < 94%    GI  #Nutrition/Fluids/Electrolytes   - replete K<4 and Mg <2  - Diet: regular   - IVF: none indicated at this time    Renal  - trend BMP    Infectious Disease  - Stroke Code CXR results: pending    # covid   - f/u CXR  - monitor sats  - Sodium chloride nasal spray for congestion  - isolation protocol    Endocrine  - A1C results: 5.5  - TSH results: 1.09    DVT Prophylaxis  - lovenox sq for DVT prophylaxis   - SCDs for DVT prophylaxis  - Consider LE dopplers pending MRI results     Dispo: pending PT/OT eval     Discussed daily hospital plans and goals with patient at bedside.    Discussed with Neurology Attending Dr. Stokes

## 2022-12-29 DIAGNOSIS — U07.1 COVID-19: ICD-10-CM

## 2022-12-29 DIAGNOSIS — G81.94 HEMIPLEGIA, UNSPECIFIED AFFECTING LEFT NONDOMINANT SIDE: ICD-10-CM

## 2022-12-29 DIAGNOSIS — Q21.12 PATENT FORAMEN OVALE: ICD-10-CM

## 2022-12-29 DIAGNOSIS — G45.9 TRANSIENT CEREBRAL ISCHEMIC ATTACK, UNSPECIFIED: ICD-10-CM

## 2022-12-29 DIAGNOSIS — R29.700 NIHSS SCORE 0: ICD-10-CM

## 2022-12-29 DIAGNOSIS — G81.92 HEMIPLEGIA, UNSPECIFIED AFFECTING LEFT DOMINANT SIDE: ICD-10-CM

## 2022-12-29 DIAGNOSIS — I73.9 PERIPHERAL VASCULAR DISEASE, UNSPECIFIED: ICD-10-CM

## 2022-12-29 DIAGNOSIS — I10 ESSENTIAL (PRIMARY) HYPERTENSION: ICD-10-CM

## 2022-12-29 DIAGNOSIS — E78.5 HYPERLIPIDEMIA, UNSPECIFIED: ICD-10-CM

## 2023-01-13 PROBLEM — Z00.00 ENCOUNTER FOR PREVENTIVE HEALTH EXAMINATION: Status: ACTIVE | Noted: 2023-01-13

## 2024-07-06 ENCOUNTER — NON-APPOINTMENT (OUTPATIENT)
Age: 74
End: 2024-07-06